# Patient Record
Sex: MALE | Race: WHITE | NOT HISPANIC OR LATINO | Employment: OTHER | URBAN - METROPOLITAN AREA
[De-identification: names, ages, dates, MRNs, and addresses within clinical notes are randomized per-mention and may not be internally consistent; named-entity substitution may affect disease eponyms.]

---

## 2017-01-04 ENCOUNTER — OFFICE VISIT (OUTPATIENT)
Dept: INTERNAL MEDICINE | Facility: CLINIC | Age: 77
End: 2017-01-04

## 2017-01-04 VITALS
HEART RATE: 71 BPM | HEIGHT: 72 IN | OXYGEN SATURATION: 98 % | DIASTOLIC BLOOD PRESSURE: 70 MMHG | BODY MASS INDEX: 28.19 KG/M2 | WEIGHT: 208.1 LBS | SYSTOLIC BLOOD PRESSURE: 112 MMHG

## 2017-01-04 DIAGNOSIS — E78.00 HYPERCHOLESTEROLEMIA: ICD-10-CM

## 2017-01-04 DIAGNOSIS — I25.10 CHRONIC CORONARY ARTERY DISEASE: ICD-10-CM

## 2017-01-04 DIAGNOSIS — K51.919 ULCERATIVE COLITIS WITH COMPLICATION, UNSPECIFIED LOCATION (HCC): ICD-10-CM

## 2017-01-04 DIAGNOSIS — I10 BENIGN ESSENTIAL HYPERTENSION: Primary | ICD-10-CM

## 2017-01-04 PROCEDURE — 99214 OFFICE O/P EST MOD 30 MIN: CPT | Performed by: INTERNAL MEDICINE

## 2017-01-04 NOTE — MR AVS SNAPSHOT
Driss Ackerman   1/4/2017 9:00 AM   Office Visit    Dept Phone:  [phone]   Encounter #:  [id number]    Provider:  Sanjuana De La Cruz MD   Department:  [facility] INTERNAL MEDICINE                Your Full Care Plan              Today's Medication Changes          These changes are accurate as of: 1/4/17  9:43 AM.  If you have any questions, ask your nurse or doctor.               Stop taking medication(s)listed here:     isosorbide mononitrate 30 MG 24 hr tablet   Commonly known as:  IMDUR   Stopped by:  Sanjuana De La Cruz MD           ramipril 2.5 MG capsule   Commonly known as:  ALTACE   Stopped by:  Sanjuana De La Cruz MD                      Your Updated Medication List          This list is accurate as of: 1/4/17  9:43 AM.  Always use your most recent med list.                acetaminophen 500 MG tablet   Commonly known as:  TYLENOL       aspirin 81 MG EC tablet       atorvastatin 10 MG tablet   Commonly known as:  LIPITOR   Take 1 tablet by mouth Daily.       Co Q 10 100 MG capsule       docusate sodium 100 MG capsule   Commonly known as:  COLACE       fish oil 1000 MG capsule capsule       mesalamine 1.2 G EC tablet   Commonly known as:  LIALDA       metoprolol succinate XL 25 MG 24 hr tablet   Commonly known as:  TOPROL-XL   Take 1 tablet by mouth Daily.       MULTIVITAMIN PO       Triamcinolone Acetonide 55 MCG/ACT nasal inhaler   Commonly known as:  NASACORT AQ   2 sprays into each nostril daily.       Vitamin B-12 ER 2000 MCG tablet controlled-release               You Were Diagnosed With        Codes Comments    Benign essential hypertension    -  Primary ICD-10-CM: I10  ICD-9-CM: 401.1     Chronic coronary artery disease     ICD-10-CM: I25.10  ICD-9-CM: 414.00     Ulcerative colitis with complication, unspecified location     ICD-10-CM: K51.919  ICD-9-CM: 556.9     Hypercholesterolemia     ICD-10-CM: E78.00  ICD-9-CM: 272.0       Instructions    Stop ramipril 2.5mg daily     Patient Instructions History      Upcoming Appointments     Visit Type Date Time Department    OFFICE VISIT 1/4/2017  9:00 AM [facility]    LAB 3/28/2017  3:10 PM  [facility]    FOLLOW UP 1 UNIT 3/28/2017  3:40 PM [facility]    [facility] 5/10/2017  9:10 AM [facility]    OFFICE VISIT 5/12/2017  9:45 AM [facility]      MyChart Signup     Our records indicate that you have an active [facility] Errplane account.    You can view your After Visit Summary by going to [employer] and logging in with your Errplane username and password.  If you don't have a Errplane username and password but a parent or guardian has access to your record, the parent or guardian should login with their own Errplane username and password and access your record to view the After Visit Summary.    If you have questions, you can email [email or web address] or call [phone] to talk to our Errplane staff.  Remember, Errplane is NOT to be used for urgent needs.  For medical emergencies, dial 911.               Other Info from Your Visit           Your Appointments     Mar 28, 2017  3:10 PM EDT   Lab with LAB CHAIR 1 [facility] (--)    [address] [address]   [phone]            Mar 28, 2017  3:40 PM EDT   FOLLOW UP with Alvin Jackman MD   [facility] ([facility])    [address] [address]   [phone]            May 10, 2017  9:10 AM EDT   [facility] with [facility] [place]   [facility] INTERNAL MEDICINE (--)    [address] [id number]   [phone]            May 12, 2017  9:45 AM EDT   Office Visit with Sanjuana De La Cruz MD   [facility] INTERNAL MEDICINE (--)    [address] [place] [id number] [phone]           Arrive 15  "minutes prior to appointment.              Allergies     Penicillins  Rash      Vital Signs     Blood Pressure Pulse Height Weight Oxygen Saturation Body Mass Index    112/70 (BP Location: Left arm, Patient Position: Sitting) 71 72.05\" (183 cm) 208 lb 1.6 oz (94.4 kg) 98% 28.18 kg/m2    Smoking Status                   Never Smoker           Problems and Diagnoses Noted     Benign essential hypertension    Heart disease due to blocked artery    High cholesterol    Hypogonadism in male    Inflammatory bowel disease (ulcerative colitis)      No Longer an Issue     Elevated white blood cell count        "

## 2017-01-04 NOTE — PROGRESS NOTES
Subjective   Driss Ackerman is a 76 y.o. male here today to f/u on HTN, memory loss and speech.  Pt has an MRI of the brain and visit with Dr Arcenio De La Cruz  Pt was taken off the imdur at last visit.  Pt states that he has been getting light headed x 2 days late afternoon.      History of Present Illness   Pt has been taking cholesterol meds as prescribed.  No difficulties with myalgias.   He has noticed his voice is better since he has seen ENT and he takes deep breathe  Pt has been taking BP meds as prescribed without any problems.  No RICE He does occas get light headed and feel like he may fall when he stands up quickly  He has been off the imdur for several weeks and reports he has not had any CP    The following portions of the patient's history were reviewed and updated as appropriate: allergies, current medications, past medical history, past social history and problem list.  He has been on a low ft diet with exercise and stress reduction    Review of Systems   Neurological: Positive for light-headedness.   All other systems reviewed and are negative.      Objective   Physical Exam   Constitutional: He is oriented to person, place, and time. He appears well-developed and well-nourished.   HENT:   Head: Normocephalic and atraumatic.   Right Ear: External ear normal.   Left Ear: External ear normal.   Mouth/Throat: Oropharynx is clear and moist.   Eyes: Conjunctivae and EOM are normal. Pupils are equal, round, and reactive to light.   Neck: Normal range of motion. No tracheal deviation present. No thyromegaly present.   Cardiovascular: Normal rate, regular rhythm, normal heart sounds and intact distal pulses.    Pulmonary/Chest: Effort normal and breath sounds normal.   Musculoskeletal: Normal range of motion. He exhibits no edema or deformity.   Neurological: He is alert and oriented to person, place, and time.   Skin: Skin is warm and dry.   Psychiatric: He has a normal mood and affect. His behavior is normal.  Judgment and thought content normal.   Vitals reviewed.      Vitals:    01/04/17 0910   BP: 112/70   Pulse: 71   SpO2: 98%        Current Outpatient Prescriptions:   •  acetaminophen (TYLENOL) 500 MG tablet, Take 500 mg by mouth Daily., Disp: , Rfl:   •  aspirin 81 MG EC tablet, Take 81 mg by mouth Daily., Disp: , Rfl:   •  atorvastatin (LIPITOR) 10 MG tablet, Take 1 tablet by mouth Daily., Disp: 90 tablet, Rfl: 0  •  Coenzyme Q10 (CO Q 10) 100 MG capsule, Take 3 tablets by mouth daily., Disp: , Rfl:   •  Cyanocobalamin (VITAMIN B-12 ER) 2000 MCG tablet controlled-release, Place 2 tablets under the tongue daily., Disp: , Rfl:   •  docusate sodium (COLACE) 100 MG capsule, Take 100 mg by mouth daily., Disp: , Rfl:   •  mesalamine (LIALDA) 1.2 G EC tablet, Take 4 tablets by mouth daily., Disp: , Rfl:   •  metoprolol succinate XL (TOPROL-XL) 25 MG 24 hr tablet, Take 1 tablet by mouth Daily. (Patient taking differently: Take 0.5 mg by mouth Daily.), Disp: 90 tablet, Rfl: 1  •  Multiple Vitamins-Minerals (MULTIVITAMIN PO), Take 1 tablet by mouth daily., Disp: , Rfl:   •  Omega-3 Fatty Acids (FISH OIL) 1000 MG capsule capsule, Take  by mouth daily with breakfast., Disp: , Rfl:   •  Triamcinolone Acetonide (NASACORT AQ) 55 MCG/ACT nasal inhaler, 2 sprays into each nostril daily., Disp: 1 each, Rfl: 11    12/23 labs- ldl-63 HDL 34    Assessment/Plan   Diagnoses and all orders for this visit:    Benign essential hypertension  -     Comprehensive Metabolic Panel; Future  -     LP+LDL / HDL Ratio ([facility]); Future  -     TSH Rfx On Abnormal To Free T4; Future    Chronic coronary artery disease  -     Comprehensive Metabolic Panel; Future  -     LP+LDL / HDL Ratio ([facility]); Future  -     TSH Rfx On Abnormal To Free T4; Future    Ulcerative colitis with complication, unspecified location    Hypercholesterolemia  -     TSH Rfx On Abnormal To Free T4; Future      1. HTN-  He is having some int orthostasis and he is a fall risk.   We will stop the altace.  Cont the toprol and he has a hx of atrial fib  2. HPL- stable with atorvastatin  3. Colitis-  He is stable with mesalamine and he has been stable and is seeing Dr. Gonzalez  4. CAD-  S/p cabg 10years ago  No sx.  Doing really well with a heart healthy program

## 2017-01-09 ENCOUNTER — TELEPHONE (OUTPATIENT)
Dept: INTERNAL MEDICINE | Facility: CLINIC | Age: 77
End: 2017-01-09

## 2017-01-09 RX ORDER — ATORVASTATIN CALCIUM 10 MG/1
10 TABLET, FILM COATED ORAL DAILY
Qty: 90 TABLET | Refills: 1 | Status: SHIPPED | OUTPATIENT
Start: 2017-01-09 | End: 2017-04-13

## 2017-01-09 NOTE — TELEPHONE ENCOUNTER
RX SENT TO PHARMACY    ----- Message from Gena Torres sent at 1/9/2017 12:06 PM EST -----  Pt needs 90 day RX sent to [facility] for:  Atorvastatin 10 mg.

## 2017-03-27 DIAGNOSIS — D64.9 ANEMIA, UNSPECIFIED TYPE: Primary | ICD-10-CM

## 2017-03-28 ENCOUNTER — OFFICE VISIT (OUTPATIENT)
Dept: ONCOLOGY | Facility: CLINIC | Age: 77
End: 2017-03-28

## 2017-03-28 ENCOUNTER — LAB (OUTPATIENT)
Dept: OTHER | Facility: HOSPITAL | Age: 77
End: 2017-03-28

## 2017-03-28 VITALS
RESPIRATION RATE: 18 BRPM | SYSTOLIC BLOOD PRESSURE: 150 MMHG | TEMPERATURE: 98.3 F | HEIGHT: 72 IN | DIASTOLIC BLOOD PRESSURE: 85 MMHG | OXYGEN SATURATION: 95 % | HEART RATE: 67 BPM | WEIGHT: 208.4 LBS | BODY MASS INDEX: 28.23 KG/M2

## 2017-03-28 DIAGNOSIS — C91.10 CHRONIC LYMPHOCYTIC LEUKEMIA (HCC): Primary | ICD-10-CM

## 2017-03-28 DIAGNOSIS — D64.9 ANEMIA, UNSPECIFIED TYPE: ICD-10-CM

## 2017-03-28 LAB
DEPRECATED RDW RBC AUTO: 45.9 FL (ref 37–54)
EOSINOPHIL # BLD MANUAL: 0.16 10*3/MM3 (ref 0–0.7)
EOSINOPHIL NFR BLD MANUAL: 1 % (ref 0–7)
ERYTHROCYTE [DISTWIDTH] IN BLOOD BY AUTOMATED COUNT: 13.2 % (ref 11.5–14.5)
FERRITIN SERPL-MCNC: 88.7 NG/ML (ref 30–400)
HCT VFR BLD AUTO: 38.4 % (ref 40.4–52.2)
HGB BLD-MCNC: 13.1 G/DL (ref 13.7–17.6)
IRON 24H UR-MRATE: 114 MCG/DL (ref 59–158)
IRON SATN MFR SERPL: 34 % (ref 20–50)
LYMPHOCYTES # BLD MANUAL: 11.11 10*3/MM3 (ref 0.8–7)
LYMPHOCYTES NFR BLD MANUAL: 1 % (ref 0–12)
LYMPHOCYTES NFR BLD MANUAL: 69 % (ref 24–44)
MCH RBC QN AUTO: 32.5 PG (ref 27–32.7)
MCHC RBC AUTO-ENTMCNC: 34.1 G/DL (ref 32.6–36.4)
MCV RBC AUTO: 95.3 FL (ref 79.8–96.2)
MONOCYTES # BLD AUTO: 0.16 10*3/MM3 (ref 0–1)
NEUTROPHILS # BLD AUTO: 3.7 10*3/MM3 (ref 1.9–8.1)
NEUTROPHILS NFR BLD MANUAL: 23 % (ref 42.7–76)
PLAT MORPH BLD: NORMAL
PLATELET # BLD AUTO: 158 10*3/MM3 (ref 140–500)
PMV BLD AUTO: 9.5 FL (ref 6–12)
RBC # BLD AUTO: 4.03 10*6/MM3 (ref 4.6–6)
RBC MORPH BLD: NORMAL
SCAN SLIDE: NORMAL
SMUDGE CELLS BLD QL SMEAR: ABNORMAL
TIBC SERPL-MCNC: 335 MCG/DL (ref 298–536)
TRANSFERRIN SERPL-MCNC: 225 MG/DL (ref 200–360)
VARIANT LYMPHS NFR BLD MANUAL: 6 % (ref 0–5)
VIT B12 BLD-MCNC: 1367 PG/ML (ref 211–946)
WBC NRBC COR # BLD: 16.1 10*3/MM3 (ref 4.5–10.7)

## 2017-03-28 PROCEDURE — 82607 VITAMIN B-12: CPT | Performed by: INTERNAL MEDICINE

## 2017-03-28 PROCEDURE — 99214 OFFICE O/P EST MOD 30 MIN: CPT | Performed by: INTERNAL MEDICINE

## 2017-03-28 PROCEDURE — 83540 ASSAY OF IRON: CPT | Performed by: INTERNAL MEDICINE

## 2017-03-28 PROCEDURE — 36415 COLL VENOUS BLD VENIPUNCTURE: CPT

## 2017-03-28 PROCEDURE — 82728 ASSAY OF FERRITIN: CPT | Performed by: INTERNAL MEDICINE

## 2017-03-28 PROCEDURE — 85007 BL SMEAR W/DIFF WBC COUNT: CPT | Performed by: INTERNAL MEDICINE

## 2017-03-28 PROCEDURE — 85025 COMPLETE CBC W/AUTO DIFF WBC: CPT | Performed by: INTERNAL MEDICINE

## 2017-03-28 PROCEDURE — 84466 ASSAY OF TRANSFERRIN: CPT | Performed by: INTERNAL MEDICINE

## 2017-03-28 RX ORDER — RAMIPRIL 2.5 MG/1
2.5 CAPSULE ORAL DAILY
COMMUNITY
End: 2018-06-13

## 2017-03-28 NOTE — PROGRESS NOTES
REASON FOR FOLLOWUP: CHRONIC LYMPHOCYTIC LEUKEMIA. PATEL STAGE II.      HISTORY OF PRESENT ILLNESS:  Driss Ackerman is a 76 y.o. male who comes in today for 6-month followup. The patient is in the company of his wife stating that actually he continues doing well with a terrific appetite after he just returned from vacation, gaining a few pounds of weight. He has not had any fevers, chills, night sweats, pruritus, peripheral adenopathy, rashes in the skin. He has minimal if any back pain and occasional pain in the right ankle for which he underwent surgery more than a year ago. He has noticed some sluggishness in his mental activity and also in his ability to walk but he has not had any obvious motor or sensory deficit. He has not had any difficulty with cranial nerves, neither alterations with tremor or rigidity or spasticity. He has not noticed any alterations in his language neither. His vision remains fine and his hearing remains limited with hearing aids ongoing and functioning well. He has normal bowel activity. No passage of blood in the stools. Normal urination with some frequency. No cardiovascular or respiratory issues at this time. Again, no infections and no obvious autoimmunity.          Medical History              Past Medical History   Diagnosis Date   • Actinic keratosis     • Allergic rhinitis     • Anemia     • Arteriosclerosis of carotid artery     • Arthritis     • Atrial fibrillation     • CAD (coronary artery disease)     • Constipation     • Eczema     • Fatigue     • Hearing loss     • HTN (hypertension)     • Hypercholesteremia     • Hypogonadism male     • Leukocytosis     • Memory change     • Myalgia     • Osteoarthritis         ANKLE, FOOT, RIGHT   • Prostatitis     • Ulcerative colitis               Surgical History           Past Surgical History   Procedure Laterality Date   • Coronary artery bypass graft       • Total knee arthroplasty           left 4/2015, right 11/2015         Right ankle arthrodesis with large bone graft and realignment. Right peroneus brevis tendon repair on 4/25/16 by .      HEMATOLOGY/ONCOLOGY HISTORY:   Patient was initially seen on 3/24/2016 for evaluation leukocytosis. The patient previously was seen by Dr. Landon Phillips and since Dr. Phillips left town recently, he has been seeing Dr. De La Cruz. This patient was seen by orthopedic surgeon, Dr. Gonzalez, for planned right ankle surgical procedure. However, because of elevated WBC patient is referred to us for evaluation and his surgery has been on hold.      Patient reports no fever, sweating, chills and especially no night sweats. He has no significant weight losses (he did have some weight loss after the surgery but has regained). The patient otherwise has no B symptoms or other specific complaints.      According to the patient, he was told having elevated WBC counts in October of 2015 when he had right knee replacement. Per computer records, the last time he had normal WBC was back in July of 2013 when he had laboratory study at Dr. Huntley’s office on 07/16/2013. The study reported a total WBC of 5200 including neutrophils 2600 and lymphocytes 2300. His hemoglobin was 14.7 and platelets 173,000. MCV was 102. His chemistry lab reported normal creatinine of 0.71, normal electrolytes and normal liver function panel. His TSH was also normal at 1.28 and vitamin D at 34.8 ng/mL. There were no CBC results available for review until 10/06/2015 when he had total WBC of 12,800 including neutrophils 2900 and lymphocytes 9600. His hemoglobin was 12.4, MCV 91, platelets 171,000. At that time patient had right knee replacement. Chemistry lab showed normal TSH of 1.8 and vitamin D at 29.6 ng/mL.     On April 16 of 2015, laboratory study on the day of surgery reported a total WBC of 13,300 including neutrophils 2400, lymphocytes 10,400, hemoglobin 13.5 and platelets 149,000. MCV was 95. Postop hemoglobin was 10.5 on April  2015.      On 3/24/16, the day of his initial oncology evaluation, his total WBC was 10,960, ANC 3000 and lymphocyte 7,210, Platelet 147,000 and hemoglobin 12.3 g/dl. , normal CMP. Serum iron 150, TIBC 321, iron saturation 47, ferritin 60.5 ng/ml, folate 16.6 ng/ml, B12 at 625 pg/ml.      Peripheral blood Flowcytometry study on 3/24/16 reported chronic lymphocytic leukemia. Negative for CD38 and ZAP70 by flow. The CLL panel reported positive for 55.5% cells deletion of chrom 13q14 DLEU1/DLEU2, negative for all the others.      CT scan on 3/29/2016 reported the neck and chest are largely unremarkable. No mass or adenopathy is present at these levels. The spleen is enlarged 16 cm at the craniocaudal extent. There are some mildly prominent lymph nodes in the bilateral inguinal regions with the largest node at the right inguinal level measuring up to 2.7 cm long axis.  . No further adenopathy is identified within the abdomen or pelvis.      SOCIAL HISTORY:  reports that he has never smoked. He does not have any smokeless tobacco history on file. He reports that he does not drink alcohol. His drug history is not on file. Patient is a retired from [employer], worked with the administration office.     FAMILY HISTORY:  family history includes Heart disease in his father and mother. Mother was diagnosed of a chronic leukemia, at about age of 55, she passed away at age of 90 to have received the chemotherapy for her malignancy. Father  at age of 85 and because of heart disease. Patient has no siblings. Patient has no inflammation about the grandparents on both sides.      ALLERGIES:          Allergies   Allergen Reactions   • Penicillins Rash       MEDICATIONS: reviewed and documented on chart, see EMR.        Review of Systems  Constitutional: See history of present illness.   HENT: Negative for congestion, ear discharge, facial swelling, mouth sores, sneezing and sore throat. patient has poor  "hearing.  Eyes: Negative for photophobia, redness and visual disturbance.   Respiratory: Negative for apnea, cough, chest tightness and shortness of breath.   Cardiovascular: Negative for palpitations, chest pain.   Gastrointestinal: Negative for abdominal distention, nausea and vomiting. patient has ulcerative colitis, controlled with medicine. No melena hematochezia  Endocrine: Negative for cold intolerance and polydipsia.   Genitourinary: Negative for difficulty urinating and dysuria.   Musculoskeletal: Has chronic her or see arthritis required a multiple surgery.   Skin: Negative for rash. no bruises no petechiae. Skin warm to touch.  Allergic/Immunologic: Negative for food allergies and immunocompromised state.   Neurological: Negative for dizziness,and headaches. patient reports shaking of his hands and with small hand writings in the past several months.   Hematological: Negative for adenopathy. see history of present illness.   Psychiatric/Behavioral: Negative for sleep disturbance. The patient is not nervous/anxious.      Vitals:    03/28/17 1430   BP: 150/85   Pulse: 67   Resp: 18   Temp: 98.3 °F (36.8 °C)   TempSrc: Oral   SpO2: 95%   Weight: 208 lb 6.4 oz (94.5 kg)   Height: 72.05\" (183 cm)   PainSc: 0-No pain     Physical Exam   GENERAL:  Well-developed, well-nourished  Patient  in no acute distress.   SKIN:  Warm, dry without rashes, purpura or petechiae.  HEENT:  Pupils were equal and reactive to light and accomodation, conjunctivas non injected, no pterigion, normal extraocular movements, normal visual acuity.   Mouth mucosa was moist, no exudates in oropharynx, normal gum line, normal roof of the mouth and pillars, normal papillations of the tongue.Ear canals were normal, as well tympanic membranes, normal hearing acuity.No pain in mastoid area or erythema.  NECK:  Supple with good range of motion; no thyromegaly or masses, no JVD or bruits, no cervical adenopathies.No carotid arteries pain, no " carotid abnormal pulsation or arterial dance.  LYMPHATICS:  No cervical, supraclavicular, axillary, epitrochlear or inguinal adenopathy.  CHEST:  Normal excursion of both starla thoraces, normal voice fremitus, no subcutaneous emphysema, normal axillas, no rashes or acanthosis nigricans. Lungs clear to percussion and auscultation, normal breath sounds bilaterally, no wheezing, crackles or ronchi, no stridor, no rubs.  CARDIAC AND VASCULAR: PMI not displaced,no thrills, normal rate and regular rhythm, without murmurs, rubs or S3 or S4 right or left sided gallops. Normal femoral, popliteal, pedis, brachial and carotid pulses.  ABDOMEN:  Soft, nontender with no organomegaly or masses, no ascites, no collateral circulation,no distention,no Juliano sign, no abdominal pain, no inguinal hernias,no umbilical hernias, no abdominal bruits. Normal bowel sounds.  GENITAL: Not  Performed.  EXTREMITIES  AND SPINE:  No clubbing, cyanosis or edema, no deformities or pain .No kyphosis, scoliosis, deformities or pain in spine, ribs or pelvic bone.  NEUROLOGICAL:  Patient was awake, alert, oriented to time, person and place,normal gait and coordination, negative Romberg; cranial nerves were normal, motor strength in upper and lower extremities was 5/5 proximally and distally, reflexes were symmetric, toes were down going, normal sensory modalities to touch, pinprick, temperature discrimination, and vibratory sensation, normal propioception, no meningeal signs with supple neck.NO TREMOR, RIGIDITY OR SPASTICITY.           DIAGNOSTIC      Lab Results   Component Value Date    WBC 16.10 (H) 03/28/2017    HGB 13.1 (L) 03/28/2017    HCT 38.4 (L) 03/28/2017    MCV 95.3 03/28/2017     03/28/2017     Lab Results   Component Value Date    NEUTROABS 3.23 09/14/2016         lymph  11,250     Lab Results   Component Value Date    GLUCOSE 125 (H) 09/14/2016    BUN 13 09/14/2016    CREATININE 0.80 12/01/2016    EGFRIFNONA 90 09/14/2016     EGFRIFAFRI  09/14/2016      Comment:      <15 Indicative of kidney failure.    BCR 15.7 09/14/2016    CO2 28.5 09/14/2016    CALCIUM 9.6 09/14/2016    PROTENTOTREF 6.5 03/14/2016    ALBUMIN 4.40 09/14/2016    LABIL2 1.8 09/14/2016    AST 14 09/14/2016    ALT 13 09/14/2016     Sodium   Date Value Ref Range Status   09/14/2016 143 134 - 145 mmol/L Final     Potassium   Date Value Ref Range Status   09/14/2016 4.0 3.5 - 4.7 mmol/L Final     Total Bilirubin   Date Value Ref Range Status   09/14/2016 0.5 0.1 - 1.2 mg/dL Final     Alkaline Phosphatase   Date Value Ref Range Status   09/14/2016 81 38 - 116 U/L Final   ]  Lab Results   Component Value Date    IRON 127 09/14/2016    TIBC 309 09/14/2016    FERRITIN 75.60 09/14/2016       ASSESSMENT:  1. Chronic lymphocytic leukemia, Koch stage II (splenomegaly, without severe anemia or thrombocytopenia). 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 No B symptom. The patient has no obvious alteration in the laboratory parameters comparing on how the blood counts were in 04/2015, having persistent leukocytosis, lymphocytosis, normal hemoglobin, normal platelet count. He has no progressive adenopathy. No progressive splenomegaly. No infections and no autoimmunity.     From the point of view of the sluggishness of his neurological function, I find not too much of anything in the physical examination besides a sluggish gait. He has no tremor. No spasticity. No rigidity. He has no abnormal movements. No obvious neuropathy. Normal balance. Normal coordination and normal strength in upper and lower extremities with no obvious alteration in cranial nerves. I doubt that he has had any strokes or anything of this nature but he is going to see his primary physician, Dr. De La Cruz, for further review. Nevertheless, I encouraged him to remain physically active, ride a static bicycle, go swimming and do a lot of  walking instead of sitting in the computer all day long.     From his leukemia point of view, I told him the issues that he could develop in case that this becomes symptomatic including fever, chills, night sweats, pruritus, lack of appetite, weight loss, rashes in the skin and progressive adenopathy. If that is the case he is supposed to give us a contact call. Otherwise, I will advise him to return to see us in 6 months with a CBC for review. I have not reviewed or changed any medicines, neither prescribed any medicines today.

## 2017-04-13 RX ORDER — ATORVASTATIN CALCIUM 10 MG/1
TABLET, FILM COATED ORAL
Qty: 90 TABLET | Refills: 1 | Status: SHIPPED | OUTPATIENT
Start: 2017-04-13 | End: 2017-11-15 | Stop reason: SDUPTHER

## 2017-05-04 ENCOUNTER — RESULTS ENCOUNTER (OUTPATIENT)
Dept: INTERNAL MEDICINE | Facility: CLINIC | Age: 77
End: 2017-05-04

## 2017-05-04 DIAGNOSIS — I10 BENIGN ESSENTIAL HYPERTENSION: ICD-10-CM

## 2017-05-04 DIAGNOSIS — I25.10 CHRONIC CORONARY ARTERY DISEASE: ICD-10-CM

## 2017-05-04 DIAGNOSIS — E78.00 HYPERCHOLESTEROLEMIA: ICD-10-CM

## 2017-05-10 LAB
ALBUMIN SERPL-MCNC: 4.5 G/DL (ref 3.5–5.2)
ALBUMIN/GLOB SERPL: 1.9 G/DL
ALP SERPL-CCNC: 79 U/L (ref 39–117)
ALT SERPL-CCNC: 24 U/L (ref 1–41)
AST SERPL-CCNC: 21 U/L (ref 1–40)
BILIRUB SERPL-MCNC: 0.5 MG/DL (ref 0.1–1.2)
BUN SERPL-MCNC: 14 MG/DL (ref 8–23)
BUN/CREAT SERPL: 18.4 (ref 7–25)
CALCIUM SERPL-MCNC: 10 MG/DL (ref 8.6–10.5)
CHLORIDE SERPL-SCNC: 103 MMOL/L (ref 98–107)
CHOLEST SERPL-MCNC: 138 MG/DL (ref 0–200)
CO2 SERPL-SCNC: 25 MMOL/L (ref 22–29)
CREAT SERPL-MCNC: 0.76 MG/DL (ref 0.76–1.27)
GLOBULIN SER CALC-MCNC: 2.4 GM/DL
GLUCOSE SERPL-MCNC: 128 MG/DL (ref 65–99)
HDLC SERPL-MCNC: 44 MG/DL (ref 40–60)
LDLC SERPL CALC-MCNC: 70 MG/DL (ref 0–100)
LDLC/HDLC SERPL: 1.58 {RATIO}
POTASSIUM SERPL-SCNC: 4.3 MMOL/L (ref 3.5–5.2)
PROT SERPL-MCNC: 6.9 G/DL (ref 6–8.5)
SODIUM SERPL-SCNC: 143 MMOL/L (ref 136–145)
TRIGL SERPL-MCNC: 122 MG/DL (ref 0–150)
TSH SERPL DL<=0.005 MIU/L-ACNC: 1.81 MIU/ML (ref 0.27–4.2)
VLDLC SERPL CALC-MCNC: 24.4 MG/DL (ref 5–40)

## 2017-05-12 ENCOUNTER — OFFICE VISIT (OUTPATIENT)
Dept: INTERNAL MEDICINE | Facility: CLINIC | Age: 77
End: 2017-05-12

## 2017-05-12 VITALS
SYSTOLIC BLOOD PRESSURE: 110 MMHG | WEIGHT: 208.6 LBS | HEART RATE: 65 BPM | BODY MASS INDEX: 28.25 KG/M2 | HEIGHT: 72 IN | OXYGEN SATURATION: 95 % | DIASTOLIC BLOOD PRESSURE: 68 MMHG

## 2017-05-12 DIAGNOSIS — E78.00 HYPERCHOLESTEROLEMIA: ICD-10-CM

## 2017-05-12 DIAGNOSIS — I10 BENIGN ESSENTIAL HYPERTENSION: Primary | ICD-10-CM

## 2017-05-12 DIAGNOSIS — R27.0 ATAXIA: ICD-10-CM

## 2017-05-12 DIAGNOSIS — R53.1 WEAKNESS: ICD-10-CM

## 2017-05-12 PROCEDURE — 99214 OFFICE O/P EST MOD 30 MIN: CPT | Performed by: INTERNAL MEDICINE

## 2017-05-18 ENCOUNTER — TREATMENT (OUTPATIENT)
Dept: PHYSICAL THERAPY | Facility: CLINIC | Age: 77
End: 2017-05-18

## 2017-05-18 DIAGNOSIS — R26.9 ABNORMAL GAIT: Primary | ICD-10-CM

## 2017-05-18 DIAGNOSIS — R53.1 WEAKNESS: ICD-10-CM

## 2017-05-18 PROCEDURE — 97161 PT EVAL LOW COMPLEX 20 MIN: CPT | Performed by: PHYSICAL THERAPIST

## 2017-05-18 PROCEDURE — 97110 THERAPEUTIC EXERCISES: CPT | Performed by: PHYSICAL THERAPIST

## 2017-05-18 PROCEDURE — 97116 GAIT TRAINING THERAPY: CPT | Performed by: PHYSICAL THERAPIST

## 2017-05-23 ENCOUNTER — TREATMENT (OUTPATIENT)
Dept: PHYSICAL THERAPY | Facility: CLINIC | Age: 77
End: 2017-05-23

## 2017-05-23 DIAGNOSIS — R53.1 WEAKNESS: ICD-10-CM

## 2017-05-23 DIAGNOSIS — R26.9 ABNORMAL GAIT: Primary | ICD-10-CM

## 2017-05-23 PROCEDURE — 97116 GAIT TRAINING THERAPY: CPT | Performed by: PHYSICAL THERAPIST

## 2017-05-23 PROCEDURE — 97110 THERAPEUTIC EXERCISES: CPT | Performed by: PHYSICAL THERAPIST

## 2017-05-24 ENCOUNTER — OFFICE VISIT (OUTPATIENT)
Dept: NEUROLOGY | Facility: CLINIC | Age: 77
End: 2017-05-24

## 2017-05-24 VITALS
OXYGEN SATURATION: 95 % | SYSTOLIC BLOOD PRESSURE: 120 MMHG | BODY MASS INDEX: 28.31 KG/M2 | HEIGHT: 72 IN | WEIGHT: 209 LBS | HEART RATE: 67 BPM | DIASTOLIC BLOOD PRESSURE: 82 MMHG

## 2017-05-24 DIAGNOSIS — R26.81 GAIT INSTABILITY: ICD-10-CM

## 2017-05-24 DIAGNOSIS — R25.1 TREMOR: Primary | ICD-10-CM

## 2017-05-24 DIAGNOSIS — R41.3 MEMORY IMPAIRMENT: ICD-10-CM

## 2017-05-24 DIAGNOSIS — K11.7 SIALORRHEA: ICD-10-CM

## 2017-05-24 PROCEDURE — 99214 OFFICE O/P EST MOD 30 MIN: CPT | Performed by: NURSE PRACTITIONER

## 2017-05-24 RX ORDER — PEG-3350, SODIUM SULFATE, SODIUM CHLORIDE, POTASSIUM CHLORIDE, SODIUM ASCORBATE AND ASCORBIC ACID 7.5-2.691G
2000 KIT ORAL
COMMUNITY
Start: 2016-07-21 | End: 2017-11-07

## 2017-05-25 ENCOUNTER — TREATMENT (OUTPATIENT)
Dept: PHYSICAL THERAPY | Facility: CLINIC | Age: 77
End: 2017-05-25

## 2017-05-25 DIAGNOSIS — R53.1 WEAKNESS: ICD-10-CM

## 2017-05-25 DIAGNOSIS — R26.9 ABNORMAL GAIT: Primary | ICD-10-CM

## 2017-05-25 PROCEDURE — 97116 GAIT TRAINING THERAPY: CPT | Performed by: PHYSICAL THERAPIST

## 2017-05-25 PROCEDURE — 97112 NEUROMUSCULAR REEDUCATION: CPT | Performed by: PHYSICAL THERAPIST

## 2017-06-01 ENCOUNTER — TREATMENT (OUTPATIENT)
Dept: PHYSICAL THERAPY | Facility: CLINIC | Age: 77
End: 2017-06-01

## 2017-06-01 DIAGNOSIS — R26.9 ABNORMAL GAIT: Primary | ICD-10-CM

## 2017-06-01 DIAGNOSIS — R53.1 WEAKNESS: ICD-10-CM

## 2017-06-01 PROCEDURE — 97140 MANUAL THERAPY 1/> REGIONS: CPT | Performed by: PHYSICAL THERAPIST

## 2017-06-01 PROCEDURE — 97110 THERAPEUTIC EXERCISES: CPT | Performed by: PHYSICAL THERAPIST

## 2017-06-01 NOTE — PROGRESS NOTES
Physical Therapy Daily Progress Note  Patient has been seen for 4 sessions    Subjective     Driss Ackerman reports: that he did have a cataract removed earlier this week, he reports that it is healing but his MD told him to take it easy.     Pain Scale:  0/10    Objective   See Exercise, Manual, and Modality Logs for complete treatment.       Assessment/Plan  Continued to work on balance and lower extremity strengthening to increase his stability.  He does have difficulty with starting and stopping at this time.     Progress per Plan of Care           Therapy Exercise 19519 15 minutes and NMR 72168 15 minutes    Timed Treatment:   30   mins   Total Treatment:    45   mins    Ximena Lane, PT  Physical Therapist  [place] License # [id number]

## 2017-06-06 ENCOUNTER — TREATMENT (OUTPATIENT)
Dept: PHYSICAL THERAPY | Facility: CLINIC | Age: 77
End: 2017-06-06

## 2017-06-06 DIAGNOSIS — R53.1 WEAKNESS: ICD-10-CM

## 2017-06-06 DIAGNOSIS — R26.9 ABNORMAL GAIT: Primary | ICD-10-CM

## 2017-06-06 PROCEDURE — 97110 THERAPEUTIC EXERCISES: CPT | Performed by: PHYSICAL THERAPIST

## 2017-06-06 PROCEDURE — 97116 GAIT TRAINING THERAPY: CPT | Performed by: PHYSICAL THERAPIST

## 2017-06-06 NOTE — PROGRESS NOTES
Physical Therapy Daily Progress Note  Patient has been seen for 5 sessions    Subjective     Driss Ackerman reports: that he has been having a good week but continues to have some difficulty with start and stopping.     Pain Scale:  0/10    Objective   See Exercise, Manual, and Modality Logs for complete treatment.       Assessment/Plan  Mr. Ackerman demonstrated some difficulty with starting stopping when walking so we focused on this, he did well when walking in a straight line but he had difficulty when side stepping.     Progress per Plan of Care           Therapy Exercise 92293 15 minutes and Gait Training 03081 15 minutes    Timed Treatment:   30   mins   Total Treatment:     60   mins    Ximena Lane, PT  Physical Therapist  [place] License # [id number]

## 2017-06-13 ENCOUNTER — TELEPHONE (OUTPATIENT)
Dept: NEUROLOGY | Facility: CLINIC | Age: 77
End: 2017-06-13

## 2017-06-13 ENCOUNTER — TREATMENT (OUTPATIENT)
Dept: PHYSICAL THERAPY | Facility: CLINIC | Age: 77
End: 2017-06-13

## 2017-06-13 DIAGNOSIS — R26.9 ABNORMAL GAIT: Primary | ICD-10-CM

## 2017-06-13 DIAGNOSIS — R53.1 WEAKNESS: ICD-10-CM

## 2017-06-13 PROCEDURE — 97116 GAIT TRAINING THERAPY: CPT | Performed by: PHYSICAL THERAPIST

## 2017-06-13 PROCEDURE — 97110 THERAPEUTIC EXERCISES: CPT | Performed by: PHYSICAL THERAPIST

## 2017-06-13 NOTE — PROGRESS NOTES
Physical Therapy Daily Progress Note  Patient has been seen for 6 sessions    Subjective     Driss Ackerman reports: that he was sorry he missed his last appointment but he was stuck at an MD appointment.  He reports that he has been doing his exercises at home.     Pain Scale: 0 /10    Objective   See Exercise, Manual, and Modality Logs for complete treatment.       Assessment/Plan  Mr. Ackerman was able to advance a lot of his exercises today with adding more weight. Today however during the hurdles he lost his balance and had a lot of difficulty once in a wider stance to bring his other leg closer.  I explained to him that this is a symptoms of Parkinsons and that the starting and stopping is hard to progress out of.     Progress per Plan of Care           Therapy Exercise 28547 15 minutes and Gait Training 86303 10 minutes  Other patient's were under my care during his treatment.    Timed Treatment:   25   mins   Total Treatment:     60   mins    Ximena Lane PT  Physical Therapist  [place] License # [id number]

## 2017-06-14 NOTE — TELEPHONE ENCOUNTER
NUCLEAR MEDICINE REPORT        FACILITY:  [facility]    UNIT/AGE/GENDER: ALFREDO  OP      AGE:77 Y          SEX:M    PATIENT NAME/:  LEONARD PAGAN P    1940    UNIT NUMBER:  [id number]    ACCOUNT NUMBER:  [id number]    ACCESSION NUMBER:  [id number]            Nuclear medicine I-123 Bala scan of the Brain        Examination date: 2017 at 1:15 PM        HISTORY: Tremor         COMPARISON: None        FINDINGS: Patient was first given 0.13 mL of SSKI one hour prior to injection of 5.5 mCi of I-123 Bala scan. Images of the brain were then obtained approximately 4 hours after injection.        Images of the brain reveal abnormal decreased uptake of the radiopharmaceutical within the striatal neurons of the basal ganglia. Significant decreased uptake is noted within the posterior portions of the basal ganglia. The pattern is relatively     symmetric.        IMPRESSION:    1. Abnormal decreased uptake within the basal ganglia. This can be seen in patients with Parkinson's disease, multisystem atrophy, progressive supranuclear palsy, or diffuse Lewy body disease.        Dictated by: Antonio George M.D.        Images and Report reviewed and interpreted by: Antonio George M.D.        <PS><Electronically signed by: Antonio George M.D.>    2017 1517        D: 2017 1516    T: 2017 1516

## 2017-06-15 ENCOUNTER — TREATMENT (OUTPATIENT)
Dept: PHYSICAL THERAPY | Facility: CLINIC | Age: 77
End: 2017-06-15

## 2017-06-15 DIAGNOSIS — R53.1 WEAKNESS: ICD-10-CM

## 2017-06-15 DIAGNOSIS — R26.9 ABNORMAL GAIT: Primary | ICD-10-CM

## 2017-06-15 PROCEDURE — 97110 THERAPEUTIC EXERCISES: CPT | Performed by: PHYSICAL THERAPIST

## 2017-06-15 NOTE — PROGRESS NOTES
Physical Therapy Daily Progress Note  Patient has been seen for Visit count could not be calculated. Make sure you are using a visit which is associated with an episode. sessions    Subjective     Driss Ackerman reports: that he is feeling very fatigued today compared to the prior visit.  He reports that he feels like his strength as improved but he continues to have a hard time with start stopping.     Pain Scale: 0 /10    Objective   See Exercise, Manual, and Modality Logs for complete treatment.       Assessment/Plan  Mr. Ackerman continues to have weakness in his legs and poor balance.  He has made improvements with his strength however he continues to have difficulty with start stopping and has begun more of a shuffling gait.  I have spoken with him before about the [facility] program and he seems very interested in it.  We have discussed making today his last day and once he returns from his trip to begin the Big and loud program at [facility] which he was in agreement with.    Other Discharge and progress to [facility] and Loud Program           Therapy Exercise 57516 30 minutes    Timed Treatment:   30   mins   Total Treatment:     45   mins    Ximena Lane PT  Physical Therapist  [place] License # [id number]

## 2017-06-15 NOTE — PROGRESS NOTES
Discharge Report    Patient Name: Driss Ackerman       Patient MRN: [id number]  : 1940  Physician:Sanjuana De La Cruz MD  Date: 6/15/2017    Encounter Diagnoses   Name Primary?   • Abnormal gait Yes   • Weakness        Treatment has included therapeutic exercise, neuromuscular re-education and gait training    Physical Therapy Daily Progress Note  Patient has been seen for Visit count could not be calculated. Make sure you are using a visit which is associated with an episode. sessions    Subjective     Driss Ackerman reports: that he is feeling very fatigued today compared to the prior visit.  He reports that he feels like his strength as improved but he continues to have a hard time with start stopping.     Pain Scale: 0 /10    Objective   See Exercise, Manual, and Modality Logs for complete treatment.       Assessment/Plan  Mr. Ackerman continues to have weakness in his legs and poor balance.  He has made improvements with his strength however he continues to have difficulty with start stopping and has begun more of a shuffling gait.  I have spoken with him before about the [facility] and [facility] program and he seems very interested in it.  We have discussed making today his last day and once he returns from his trip to begin the Big and loud program at [facility] which he was in agreement with.    Other Discharge and progress to [facility] and Loud Program           Therapy Exercise 61598 30 minutes    Timed Treatment:   30   mins   Total Treatment:     45   mins    Ximena Lane PT  Physical Therapist  [place] License # [id number]        Assessment: see above  Progress towards goals: Partially Met    Discharge Reason: Plateau in patient's progress      Plan of Care: Refer to other services (specify):[facility] and L[facility] program    Prognosis: fair    Understanding at Discharge: excellent

## 2017-06-15 NOTE — TELEPHONE ENCOUNTER
Will you let them know that the results were abnormal, indicating that he may have Parkinson's Disease. I would recommend a trial of the medicine, carbidopa/levodopa (Sinemet) as discussed at his appointment. Will you discuss this with his daughter sophy tilley I want to make sure this is clear. If they are okay with starting a trial of the medication as discussed let me know and I will send in the prescription. It will ultimately be 1 tablet three times daily, morning, afternoon, and dinnertime. We   will start lower than that and work our way up.

## 2017-06-16 ENCOUNTER — TELEPHONE (OUTPATIENT)
Dept: INTERNAL MEDICINE | Facility: CLINIC | Age: 77
End: 2017-06-16

## 2017-06-16 RX ORDER — MESALAMINE 1.2 G/1
3.6 TABLET, DELAYED RELEASE ORAL DAILY
Qty: 270 TABLET | Refills: 1 | Status: SHIPPED | OUTPATIENT
Start: 2017-06-16

## 2017-06-16 NOTE — TELEPHONE ENCOUNTER
RX SENT TO PHARMACY    ----- Message from Gena Torres sent at 6/16/2017 10:46 AM EDT -----  Pt needs a 90 day RX sent to [facility] for:  Dejon 1.2 G ED tab

## 2017-07-17 ENCOUNTER — TELEPHONE (OUTPATIENT)
Dept: NEUROLOGY | Facility: CLINIC | Age: 77
End: 2017-07-17

## 2017-07-17 NOTE — TELEPHONE ENCOUNTER
----- Message from Madhuri Nation sent at 7/17/2017  9:08 AM EDT -----  Contact: [phone]  Pt needs a refill on carbidopa-levodopa ([facility]). Pt is doing very well on the medication. He wants to know if Davonte Archibald can up the dosage, he thinks that he will do even better if the medication was increased.

## 2017-07-18 ENCOUNTER — TELEPHONE (OUTPATIENT)
Dept: NEUROLOGY | Facility: CLINIC | Age: 77
End: 2017-07-18

## 2017-07-18 DIAGNOSIS — G20 PARKINSON'S DISEASE (HCC): Primary | ICD-10-CM

## 2017-07-18 NOTE — TELEPHONE ENCOUNTER
----- Message from Madhuri Nation sent at 7/18/2017  9:30 AM EDT -----  Contact: [phone]  Pts wife called and wants to know where do they go from here. She wants to know what the test showed about pt possibly having Parkinson's. They just want to know what are the signs that they should be looking for. Wife stated that they were getting ready for vacation before they was able to have an appointment. Wife is confused about a few things and just wants to clarify what's going on.

## 2017-07-18 NOTE — TELEPHONE ENCOUNTER
Spoke with wife. She is wanting to know what they should be doing now that he has been diagnosed. She was just curious because when he had the MAKEDA scan they went through the parkinsons center at [place]. She noticed they had a PT area & is wanting to know if that is something they should be doing. I think she is wanting a referral to [place]

## 2017-07-18 NOTE — TELEPHONE ENCOUNTER
Spoke with patient. He would like to increase to 1.5 tablets three times daily. He only has 3 days of medication left. He is [facility] & 90 day to [facility]

## 2017-09-19 ENCOUNTER — APPOINTMENT (OUTPATIENT)
Dept: ONCOLOGY | Facility: CLINIC | Age: 77
End: 2017-09-19

## 2017-09-19 ENCOUNTER — APPOINTMENT (OUTPATIENT)
Dept: OTHER | Facility: HOSPITAL | Age: 77
End: 2017-09-19

## 2017-10-17 ENCOUNTER — TELEPHONE (OUTPATIENT)
Dept: INTERNAL MEDICINE | Facility: CLINIC | Age: 77
End: 2017-10-17

## 2017-10-17 NOTE — TELEPHONE ENCOUNTER
FORM AT , PT AWARE    ----- Message from Sanjuana De La Cruz MD sent at 10/17/2017  1:09 PM EDT -----  Regarding: RE: HANDICAP FORM  ok  ----- Message -----     From: Chelsy Butler MA     Sent: 10/17/2017   9:15 AM       To: Sanjuana De La Cruz MD  Subject: FW: HANDICAP FORM                                FORM IN CUBBY TO SIGN  ----- Message -----     From: Josie Acosta     Sent: 10/16/2017  11:40 AM       To: Chelsy Butler MA  Subject: HANDICAP FORM                                    Mr Ackerman called about getting a handicap placard for his car. He wants us to fill out the form and have it signed for him to come and . They are getting ready to go out of town and he would like to have it before they leave. He said his Parkinsons is getting worse and making it tough to get around. Please call him when it is ready to .  Thanks

## 2017-11-07 ENCOUNTER — OFFICE VISIT (OUTPATIENT)
Dept: ONCOLOGY | Facility: CLINIC | Age: 77
End: 2017-11-07

## 2017-11-07 ENCOUNTER — LAB (OUTPATIENT)
Dept: OTHER | Facility: HOSPITAL | Age: 77
End: 2017-11-07

## 2017-11-07 VITALS
SYSTOLIC BLOOD PRESSURE: 116 MMHG | TEMPERATURE: 97.9 F | HEART RATE: 65 BPM | HEIGHT: 72 IN | WEIGHT: 212 LBS | DIASTOLIC BLOOD PRESSURE: 71 MMHG | RESPIRATION RATE: 16 BRPM | BODY MASS INDEX: 28.71 KG/M2 | OXYGEN SATURATION: 98 %

## 2017-11-07 DIAGNOSIS — C91.10 CHRONIC LYMPHOCYTIC LEUKEMIA (HCC): ICD-10-CM

## 2017-11-07 DIAGNOSIS — C91.10 CHRONIC LYMPHOCYTIC LEUKEMIA (HCC): Primary | ICD-10-CM

## 2017-11-07 DIAGNOSIS — R53.82 CHRONIC FATIGUE: ICD-10-CM

## 2017-11-07 DIAGNOSIS — D69.6 THROMBOCYTOPENIA (HCC): ICD-10-CM

## 2017-11-07 LAB
BASOPHILS # BLD AUTO: 0.06 10*3/MM3 (ref 0–0.2)
BASOPHILS NFR BLD AUTO: 0.3 % (ref 0–1.5)
DEPRECATED RDW RBC AUTO: 45.2 FL (ref 37–54)
EOSINOPHIL # BLD AUTO: 0.16 10*3/MM3 (ref 0–0.7)
EOSINOPHIL NFR BLD AUTO: 0.8 % (ref 0.3–6.2)
ERYTHROCYTE [DISTWIDTH] IN BLOOD BY AUTOMATED COUNT: 13.3 % (ref 11.5–14.5)
HCT VFR BLD AUTO: 38 % (ref 40.4–52.2)
HGB BLD-MCNC: 13.1 G/DL (ref 13.7–17.6)
IMM GRANULOCYTES # BLD: 0.05 10*3/MM3 (ref 0–0.03)
IMM GRANULOCYTES NFR BLD: 0.2 % (ref 0–0.5)
LYMPHOCYTES # BLD AUTO: 14.48 10*3/MM3 (ref 0.9–4.8)
LYMPHOCYTES NFR BLD AUTO: 69.2 % (ref 19.6–45.3)
MCH RBC QN AUTO: 32.2 PG (ref 27–32.7)
MCHC RBC AUTO-ENTMCNC: 34.5 G/DL (ref 32.6–36.4)
MCV RBC AUTO: 93.4 FL (ref 79.8–96.2)
MONOCYTES # BLD AUTO: 0.45 10*3/MM3 (ref 0.2–1.2)
MONOCYTES NFR BLD AUTO: 2.2 % (ref 5–12)
NEUTROPHILS # BLD AUTO: 5.72 10*3/MM3 (ref 1.9–8.1)
NEUTROPHILS NFR BLD AUTO: 27.3 % (ref 42.7–76)
NRBC BLD MANUAL-RTO: 0 /100 WBC (ref 0–0)
PLAT MORPH BLD: NORMAL
PLATELET # BLD AUTO: 151 10*3/MM3 (ref 140–500)
PMV BLD AUTO: 9.1 FL (ref 6–12)
RBC # BLD AUTO: 4.07 10*6/MM3 (ref 4.6–6)
RBC MORPH BLD: NORMAL
SMUDGE CELLS BLD QL SMEAR: NORMAL
WBC NRBC COR # BLD: 20.92 10*3/MM3 (ref 4.5–10.7)

## 2017-11-07 PROCEDURE — 85007 BL SMEAR W/DIFF WBC COUNT: CPT | Performed by: INTERNAL MEDICINE

## 2017-11-07 PROCEDURE — 36415 COLL VENOUS BLD VENIPUNCTURE: CPT

## 2017-11-07 PROCEDURE — 99213 OFFICE O/P EST LOW 20 MIN: CPT | Performed by: INTERNAL MEDICINE

## 2017-11-07 PROCEDURE — 85025 COMPLETE CBC W/AUTO DIFF WBC: CPT | Performed by: INTERNAL MEDICINE

## 2017-11-07 RX ORDER — CITALOPRAM 10 MG/1
20 TABLET ORAL
COMMUNITY
Start: 2017-10-04 | End: 2020-07-06 | Stop reason: SDUPTHER

## 2017-11-07 NOTE — PROGRESS NOTES
REASON FOR FOLLOWUP: CHRONIC LYMPHOCYTIC LEUKEMIA. PATEL STAGE II.      HISTORY OF PRESENT ILLNESS:  Driss Ackerman is a 76 y.o. male who comes in today for 12-month followup. The patient is in the company of his wife stating that actually he continues doing well with a terrific appetite after he just returned from vacation, gaining a few pounds of weight. He has not had any fevers, chills, night sweats, pruritus, peripheral adenopathy, rashes in the skin. He has minimal if any back pain and occasional pain in the right ankle for which he underwent surgery more than a year ago.He has normal bowel activity. No passage of blood in the stools. Normal urination with some frequency. No cardiovascular or respiratory issues at this time. Again, no infections and no obvious autoimmunity. He was diagnosed with Parkinson's disease in absence of any tremor in the summer of this year and he is undergoing therapy for this in multiple ways by neurology. He feels better in this regard. He has not had any falls and again he has not had any tremor.              Medical History              Past Medical History   Diagnosis Date   • Actinic keratosis     • Allergic rhinitis     • Anemia     • Arteriosclerosis of carotid artery     • Arthritis     • Atrial fibrillation     • CAD (coronary artery disease)     • Constipation     • Eczema     • Fatigue     • Hearing loss     • HTN (hypertension)     • Hypercholesteremia     • Hypogonadism male     • Leukocytosis     • Memory change     • Myalgia     • Osteoarthritis         ANKLE, FOOT, RIGHT   • Prostatitis     • Ulcerative colitis               Surgical History           Past Surgical History   Procedure Laterality Date   • Coronary artery bypass graft       • Total knee arthroplasty           left 4/2015, right 11/2015        Right ankle arthrodesis with large bone graft and realignment. Right peroneus brevis tendon repair on 4/25/16 by .      HEMATOLOGY/ONCOLOGY HISTORY:    Patient was initially seen on 3/24/2016 for evaluation leukocytosis. The patient previously was seen by Dr. Landon Phillips and since Dr. Phillips left town recently, he has been seeing Dr. De La Cruz. This patient was seen by orthopedic surgeon, Dr. Gonzalez, for planned right ankle surgical procedure. However, because of elevated WBC patient is referred to us for evaluation and his surgery has been on hold.      Patient reports no fever, sweating, chills and especially no night sweats. He has no significant weight losses (he did have some weight loss after the surgery but has regained). The patient otherwise has no B symptoms or other specific complaints.      According to the patient, he was told having elevated WBC counts in October of 2015 when he had right knee replacement. Per computer records, the last time he had normal WBC was back in July of 2013 when he had laboratory study at Dr. Huntley’s office on 07/16/2013. The study reported a total WBC of 5200 including neutrophils 2600 and lymphocytes 2300. His hemoglobin was 14.7 and platelets 173,000. MCV was 102. His chemistry lab reported normal creatinine of 0.71, normal electrolytes and normal liver function panel. His TSH was also normal at 1.28 and vitamin D at 34.8 ng/mL. There were no CBC results available for review until 10/06/2015 when he had total WBC of 12,800 including neutrophils 2900 and lymphocytes 9600. His hemoglobin was 12.4, MCV 91, platelets 171,000. At that time patient had right knee replacement. Chemistry lab showed normal TSH of 1.8 and vitamin D at 29.6 ng/mL.     On April 16 of 2015, laboratory study on the day of surgery reported a total WBC of 13,300 including neutrophils 2400, lymphocytes 10,400, hemoglobin 13.5 and platelets 149,000. MCV was 95. Postop hemoglobin was 10.5 on April 21 2015.      On 3/24/16, the day of his initial oncology evaluation, his total WBC was 10,960, ANC 3000 and lymphocyte 7,210, Platelet 147,000 and hemoglobin  12.3 g/dl. , normal CMP. Serum iron 150, TIBC 321, iron saturation 47, ferritin 60.5 ng/ml, folate 16.6 ng/ml, B12 at 625 pg/ml.      Peripheral blood Flowcytometry study on 3/24/16 reported chronic lymphocytic leukemia. Negative for CD38 and ZAP70 by flow. The CLL panel reported positive for 55.5% cells deletion of chrom 13q14 DLEU1/DLEU2, negative for all the others.      CT scan on 3/29/2016 reported the neck and chest are largely unremarkable. No mass or adenopathy is present at these levels. The spleen is enlarged 16 cm at the craniocaudal extent. There are some mildly prominent lymph nodes in the bilateral inguinal regions with the largest node at the right inguinal level measuring up to 2.7 cm long axis.  . No further adenopathy is identified within the abdomen or pelvis.      SOCIAL HISTORY:  reports that he has never smoked. He does not have any smokeless tobacco history on file. He reports that he does not drink alcohol. His drug history is not on file. Patient is a retired from [employer], worked with the administration office.     FAMILY HISTORY:  family history includes Heart disease in his father and mother. Mother was diagnosed of a chronic leukemia, at about age of 55, she passed away at age of 90 to have received the chemotherapy for her malignancy. Father  at age of 85 and because of heart disease. Patient has no siblings. Patient has no inflammation about the grandparents on both sides.      ALLERGIES:          Allergies   Allergen Reactions   • Penicillins Rash       MEDICATIONS: reviewed and documented on chart, see EMR.        Review of Systems  Constitutional: See history of present illness.   HENT: Negative for congestion, ear discharge, facial swelling, mouth sores, sneezing and sore throat. patient has poor hearing.  Eyes: Negative for photophobia, redness and visual disturbance.   Respiratory: Negative for apnea, cough, chest tightness and shortness of breath.  "  Cardiovascular: Negative for palpitations, chest pain.   Gastrointestinal: Negative for abdominal distention, nausea and vomiting. patient has ulcerative colitis, controlled with medicine. No melena hematochezia  Endocrine: Negative for cold intolerance and polydipsia.   Genitourinary: Negative for difficulty urinating and dysuria.   Musculoskeletal: Has chronic her or see arthritis required a multiple surgery.   Skin: Negative for rash. no bruises no petechiae. Skin warm to touch.  Allergic/Immunologic: Negative for food allergies and immunocompromised state.   Neurological: Negative for dizziness,and headaches.   Hematological: Negative for adenopathy. see history of present illness.   Psychiatric/Behavioral: Negative for sleep disturbance. The patient is not nervous/anxious.      Vitals:    11/07/17 1111   BP: 116/71   Pulse: 65   Resp: 16   Temp: 97.9 °F (36.6 °C)   SpO2: 98%   Weight: 212 lb (96.2 kg)   Height: 72.05\" (183 cm)   PainSc:   3   PainLoc: Ankle     Physical Exam   GENERAL:  Well-developed, well-nourished  Patient  in no acute distress.   SKIN:  Warm, dry without rashes, purpura or petechiae.  HEENT:  Pupils were equal and reactive to light and accomodation, conjunctivas non injected, no pterigion, normal extraocular movements, normal visual acuity.   Mouth mucosa was moist, no exudates in oropharynx, normal gum line, normal roof of the mouth and pillars, normal papillations of the tongue.Ear canals were normal, as well tympanic membranes, normal hearing acuity.No pain in mastoid area or erythema.  NECK:  Supple with good range of motion; no thyromegaly or masses, no JVD or bruits, no cervical adenopathies.No carotid arteries pain, no carotid abnormal pulsation or arterial dance.  LYMPHATICS:  No cervical, supraclavicular, axillary, epitrochlear or inguinal adenopathy.  CHEST:  Normal excursion of both starla thoraces, normal voice fremitus, no subcutaneous emphysema, normal axillas, no rashes or " acanthosis nigricans. Lungs clear to percussion and auscultation, normal breath sounds bilaterally, no wheezing, crackles or ronchi, no stridor, no rubs.  CARDIAC AND VASCULAR: PMI not displaced,no thrills, normal rate and regular rhythm, without murmurs, rubs or S3 or S4 right or left sided gallops. Normal femoral, popliteal, pedis, brachial and carotid pulses.  ABDOMEN:  Soft, nontender with no organomegaly or masses, no ascites, no collateral circulation,no distention,no Juliano sign, no abdominal pain, no inguinal hernias,no umbilical hernias, no abdominal bruits. Normal bowel sounds.  GENITAL: Not  Performed.  EXTREMITIES  AND SPINE:  No clubbing, cyanosis or edema, no deformities or pain .No kyphosis, scoliosis, deformities or pain in spine, ribs or pelvic bone.  NEUROLOGICAL:  Patient was awake, alert, oriented to time, person and place.       DIAGNOSTIC      Lab Results   Component Value Date    WBC 20.92 (H) 11/07/2017    HGB 13.1 (L) 11/07/2017    HCT 38.0 (L) 11/07/2017    MCV 93.4 11/07/2017     11/07/2017     Lab Results   Component Value Date    NEUTROABS 5.72 11/07/2017         lymph  11,250     Lab Results   Component Value Date    GLUCOSE 125 (H) 09/14/2016    BUN 14 05/10/2017    CREATININE 0.76 05/10/2017    EGFRIFNONA 99 05/10/2017    EGFRIFAFRI 121 05/10/2017    BCR 18.4 05/10/2017    CO2 25.0 05/10/2017    CALCIUM 10.0 05/10/2017    PROTENTOTREF 6.9 05/10/2017    ALBUMIN 4.50 05/10/2017    LABIL2 1.9 05/10/2017    AST 21 05/10/2017    ALT 24 05/10/2017     Sodium   Date Value Ref Range Status   05/10/2017 143 136 - 145 mmol/L Final   09/14/2016 143 134 - 145 mmol/L Final     Potassium   Date Value Ref Range Status   05/10/2017 4.3 3.5 - 5.2 mmol/L Final   09/14/2016 4.0 3.5 - 4.7 mmol/L Final     Total Bilirubin   Date Value Ref Range Status   05/10/2017 0.5 0.1 - 1.2 mg/dL Final   09/14/2016 0.5 0.1 - 1.2 mg/dL Final     Alkaline Phosphatase   Date Value Ref Range Status   05/10/2017 79  39 - 117 U/L Final   09/14/2016 81 38 - 116 U/L Final   ]  Lab Results   Component Value Date    IRON 114 03/28/2017    TIBC 335 03/28/2017    FERRITIN 88.70 03/28/2017       ASSESSMENT:  1. Chronic lymphocytic leukemia, Koch stage II (splenomegaly, without severe anemia or thrombocytopenia). 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 No B symptom. The patient has no obvious alteration in the laboratory parameters comparing on how the blood counts were in 04/2015, having persistent leukocytosis, lymphocytosis, normal hemoglobin, normal platelet count. He has no progressive adenopathy. No progressive splenomegaly. No infections and no autoimmunity.       From his leukemia point of view, I told him the issues that he could develop in case that this becomes symptomatic including fever, chills, night sweats, pruritus, lack of appetite, weight loss, rashes in the skin and progressive adenopathy. If that is the case he is supposed to give us a contact call. Otherwise, I will advise him to return to see us in 12 months with a CBC for review. I have not reviewed or changed any medicines, neither prescribed any medicines today.

## 2017-11-09 ENCOUNTER — OFFICE VISIT (OUTPATIENT)
Dept: INTERNAL MEDICINE | Facility: CLINIC | Age: 77
End: 2017-11-09

## 2017-11-09 VITALS
TEMPERATURE: 98 F | DIASTOLIC BLOOD PRESSURE: 62 MMHG | HEART RATE: 79 BPM | SYSTOLIC BLOOD PRESSURE: 108 MMHG | WEIGHT: 209.13 LBS | HEIGHT: 72 IN | BODY MASS INDEX: 28.33 KG/M2 | OXYGEN SATURATION: 98 %

## 2017-11-09 DIAGNOSIS — J06.9 ACUTE URI: Primary | ICD-10-CM

## 2017-11-09 PROCEDURE — 99213 OFFICE O/P EST LOW 20 MIN: CPT | Performed by: NURSE PRACTITIONER

## 2017-11-09 NOTE — PROGRESS NOTES
Subjective   Driss Ackerman is a 77 y.o. male. Patient is here today for   Chief Complaint   Patient presents with   • URI     Pt complains of having a productive cough, ST, green drainage & nasal congestion x4 days. Pt has tried taking Zantac & saline nose spray.    .    History of Present Illness   C/o nasal congestion x 5 days associated with chest congestion, productive cough. Denies fever, shortness of breath or wheezing. He did have a flu vaccine.  He has been using Zyrtec and saline nasal spray. States that he is actually feeling a little better today.    The following portions of the patient's history were reviewed and updated as appropriate: allergies, current medications, past family history, past medical history, past social history, past surgical history and problem list.    Review of Systems   Constitutional: Negative.    HENT: Positive for congestion and sore throat.    Respiratory: Positive for cough. Negative for shortness of breath and wheezing.    Cardiovascular: Negative.    Gastrointestinal: Negative.        Objective   Vitals:    11/09/17 1547   BP: 108/62   Pulse: 79   Temp: 98 °F (36.7 °C)   SpO2: 98%     Physical Exam   Constitutional: Vital signs are normal. He appears well-developed and well-nourished.   HENT:   Right Ear: Tympanic membrane and ear canal normal.   Left Ear: Tympanic membrane and ear canal normal.   Mouth/Throat: Posterior oropharyngeal erythema present.   Cardiovascular: Normal rate, regular rhythm and normal heart sounds.    Pulmonary/Chest: Effort normal and breath sounds normal.   Lymphadenopathy:     He has no cervical adenopathy.   Skin: Skin is warm, dry and intact.   Psychiatric: He has a normal mood and affect. His speech is normal and behavior is normal. Thought content normal.   Nursing note and vitals reviewed.      Assessment/Plan   Driss was seen today for uri.    Diagnoses and all orders for this visit:    Acute URI    Since patient is starting to feel  better, there is no need for antibiotics at this point.  Patient is to continue with over-the-counter medicines, increase fluids and rest.  He will call if his symptoms do not resolve.

## 2017-11-16 RX ORDER — ATORVASTATIN CALCIUM 10 MG/1
TABLET, FILM COATED ORAL
Qty: 90 TABLET | Refills: 1 | Status: SHIPPED | OUTPATIENT
Start: 2017-11-16 | End: 2019-07-11

## 2018-03-26 RX ORDER — METOPROLOL SUCCINATE 25 MG/1
TABLET, EXTENDED RELEASE ORAL
Qty: 90 TABLET | Refills: 1 | Status: SHIPPED | OUTPATIENT
Start: 2018-03-26 | End: 2018-06-13

## 2018-06-13 ENCOUNTER — OFFICE VISIT (OUTPATIENT)
Dept: INTERNAL MEDICINE | Facility: CLINIC | Age: 78
End: 2018-06-13

## 2018-06-13 VITALS
HEIGHT: 72 IN | TEMPERATURE: 98.2 F | WEIGHT: 208 LBS | OXYGEN SATURATION: 96 % | HEART RATE: 73 BPM | SYSTOLIC BLOOD PRESSURE: 118 MMHG | DIASTOLIC BLOOD PRESSURE: 72 MMHG | BODY MASS INDEX: 28.17 KG/M2

## 2018-06-13 DIAGNOSIS — I48.91 ATRIAL FIBRILLATION, UNSPECIFIED TYPE (HCC): ICD-10-CM

## 2018-06-13 DIAGNOSIS — G47.33 OSA ON CPAP: ICD-10-CM

## 2018-06-13 DIAGNOSIS — E78.00 HYPERCHOLESTEROLEMIA: ICD-10-CM

## 2018-06-13 DIAGNOSIS — I10 BENIGN ESSENTIAL HYPERTENSION: ICD-10-CM

## 2018-06-13 DIAGNOSIS — Z99.89 OSA ON CPAP: ICD-10-CM

## 2018-06-13 DIAGNOSIS — Z00.00 MEDICARE ANNUAL WELLNESS VISIT, INITIAL: ICD-10-CM

## 2018-06-13 DIAGNOSIS — G20 PARKINSON DISEASE (HCC): Primary | ICD-10-CM

## 2018-06-13 PROBLEM — G20.A1 PARKINSON DISEASE: Status: ACTIVE | Noted: 2018-06-13

## 2018-06-13 PROCEDURE — G0009 ADMIN PNEUMOCOCCAL VACCINE: HCPCS | Performed by: INTERNAL MEDICINE

## 2018-06-13 PROCEDURE — 90670 PCV13 VACCINE IM: CPT | Performed by: INTERNAL MEDICINE

## 2018-06-13 PROCEDURE — G0438 PPPS, INITIAL VISIT: HCPCS | Performed by: INTERNAL MEDICINE

## 2018-06-13 PROCEDURE — 99214 OFFICE O/P EST MOD 30 MIN: CPT | Performed by: INTERNAL MEDICINE

## 2018-06-13 RX ORDER — CARBIDOPA/LEVODOPA 25MG-250MG
1.5 TABLET ORAL 4 TIMES DAILY
COMMUNITY
Start: 2018-04-10

## 2018-06-13 RX ORDER — FLUOROURACIL 50 MG/G
CREAM TOPICAL
COMMUNITY
Start: 2018-04-23 | End: 2019-12-09

## 2018-06-13 RX ORDER — FLUTICASONE PROPIONATE 50 MCG
2 SPRAY, SUSPENSION (ML) NASAL DAILY
Qty: 3 BOTTLE | Refills: 1 | Status: SHIPPED | OUTPATIENT
Start: 2018-06-13

## 2018-06-13 NOTE — PROGRESS NOTES
QUICK REFERENCE INFORMATION:  The ABCs of the Annual Wellness Visit    Initial Medicare Wellness Visit    HEALTH RISK ASSESSMENT    1940    Recent Hospitalizations:  No hospitalization(s) within the last year..        Current Medical Providers:  Patient Care Team:  Sanjuana De La Cruz MD as PCP - General  Sanjuana De La Cruz MD as Referring Physician (Internal Medicine)  Alvin Jackman MD as Consulting Physician (Hematology and Oncology)        Smoking Status:  History   Smoking Status   • Never Smoker   Smokeless Tobacco   • Never Used       Alcohol Consumption:  History   Alcohol Use No       Depression Screen:   PHQ-2/PHQ-9 Depression Screening 6/13/2018   Little interest or pleasure in doing things 0   Feeling down, depressed, or hopeless 1   Trouble falling or staying asleep, or sleeping too much 0   Feeling tired or having little energy 1   Poor appetite or overeating 0   Feeling bad about yourself - or that you are a failure or have let yourself or your family down 0   Trouble concentrating on things, such as reading the newspaper or watching television 2   Moving or speaking so slowly that other people could have noticed. Or the opposite - being so fidgety or restless that you have been moving around a lot more than usual 0   Thoughts that you would be better off dead, or of hurting yourself in some way 0   Total Score 4   If you checked off any problems, how difficult have these problems made it for you to do your work, take care of things at home, or get along with other people? Not difficult at all       Health Habits and Functional and Cognitive Screening:  Functional & Cognitive Status 6/13/2018   Do you have difficulty preparing food and eating? No   Do you have difficulty bathing yourself, getting dressed or grooming yourself? No   Do you have difficulty using the toilet? No   Do you have difficulty moving around from place to place? No   Do you have trouble with steps or getting out of a bed or a chair? Yes    In the past year have you fallen or experienced a near fall? No   Current Diet Well Balanced Diet   Dental Exam Up to date   Eye Exam Not up to date   Exercise (times per week) 3 times per week   Current Exercise Activities Include Weightlifting   Do you need help using the phone?  No   Are you deaf or do you have serious difficulty hearing?  Yes   Do you need help with transportation? No   Do you need help shopping? No   Do you need help preparing meals?  No   Do you need help with housework?  No   Do you need help with laundry? No   Do you need help taking your medications? No   Do you need help managing money? No   Do you ever drive or ride in a car without wearing a seat belt? No   Have you felt unusual stress, anger or loneliness in the last month? No   Who do you live with? Spouse   If you need help, do you have trouble finding someone available to you? No   Have you been bothered in the last four weeks by sexual problems? Yes   Do you have difficulty concentrating, remembering or making decisions? Yes           Does the patient have evidence of cognitive impairment? Yes  Memory getting owrse  Seeing neuro  Asiprin use counseling: Taking ASA appropriately as indicated      Recent Lab Results:    Visual Acuity:  No exam data present    Age-appropriate Screening Schedule:  Refer to the list below for future screening recommendations based on patient's age, sex and/or medical conditions. Orders for these recommended tests are listed in the plan section. The patient has been provided with a written plan.    Health Maintenance   Topic Date Due   • TDAP/TD VACCINES (1 - Tdap) 03/17/1959   • ZOSTER VACCINE (2 of 3) 05/06/2011   • PNEUMOCOCCAL VACCINES (65+ LOW/MEDIUM RISK) (2 of 2 - PCV13) 03/11/2012   • LIPID PANEL  05/10/2018   • INFLUENZA VACCINE  08/01/2018        Subjective   History of Present Illness    Driss Ackerman is a 78 y.o. male who presents for an Annual Wellness Visit.    The following portions of  the patient's history were reviewed and updated as appropriate: allergies, current medications, past family history, past medical history, past social history, past surgical history and problem list.    Outpatient Medications Prior to Visit   Medication Sig Dispense Refill   • acetaminophen (TYLENOL) 500 MG tablet Take 500 mg by mouth Daily.     • aspirin 81 MG EC tablet Take 81 mg by mouth Daily.     • atorvastatin (LIPITOR) 10 MG tablet TAKE 1 TABLET DAILY 90 tablet 1   • citalopram (CeleXA) 10 MG tablet      • Coenzyme Q10 (CO Q 10) 100 MG capsule Take 1 tablet by mouth Daily.     • Cyanocobalamin (VITAMIN B-12 ER) 2000 MCG tablet controlled-release Place 1 tablet under the tongue Daily.     • Docusate Calcium (STOOL SOFTENER PO) Take  by mouth Daily.     • mesalamine (LIALDA) 1.2 G EC tablet Take 3 tablets by mouth Daily. 270 tablet 1   • Multiple Vitamins-Minerals (MULTIVITAMIN PO) Take 1 tablet by mouth daily.     • Omega-3 Fatty Acids (FISH OIL) 1000 MG capsule capsule Take  by mouth daily with breakfast.     • metoprolol succinate XL (TOPROL-XL) 25 MG 24 hr tablet TAKE 1 TABLET DAILY 90 tablet 1   • ramipril (ALTACE) 2.5 MG capsule Take 2.5 mg by mouth Daily.     • carbidopa-levodopa (SINEMET)  MG per tablet Take 1.5 tablets by mouth three times daily 405 tablet 1     No facility-administered medications prior to visit.        Patient Active Problem List   Diagnosis   • Chronic coronary artery disease   • Arthritis   • Atrial fibrillation   • Benign prostatic hyperplasia with urinary obstruction   • Fatigue   • Hypercholesterolemia   • Hypogonadism in male   • Memory impairment   • Ulcerative colitis   • Vitamin D deficiency   • Atopic rhinitis   • Benign essential hypertension   • Muscle pain   • Chronic lymphocytic leukemia   • Thrombocytopenia   • Ulcerative proctitis with complication   • Parkinson disease   • PAVEL on CPAP       Advance Care Planning:  has an advance directive - a copy HAS NOT been  "provided. Have asked the patient to send this to us to add to record.    Identification of Risk Factors:  Risk factors include: increased fall risk, cognitive impairment and incontinence.    Review of Systems    Compared to one year ago, the patient feels his physical health is worse.  Compared to one year ago, the patient feels his mental health is worse.    Objective     Physical Exam    Vitals:    06/13/18 1046   BP: 118/72   Pulse: 73   Temp: 98.2 °F (36.8 °C)   SpO2: 96%   Weight: 94.3 kg (208 lb)   Height: 182.9 cm (72.01\")   PainSc:   2       Patient's Body mass index is 28.2 kg/m². BMI is within normal parameters. No follow-up required.      Assessment/Plan   Patient Self-Management and Personalized Health Advice  The patient has been provided with information about: diet, exercise, the relationship between weight and GERD and fall prevention and preventive services including:   · Exercise counseling provided, Nutrition counseling provided.    Visit Diagnoses:    ICD-10-CM ICD-9-CM   1. Parkinson disease G20 332.0   2. Hypercholesterolemia E78.00 272.0   3. PAVEL on CPAP G47.33 327.23    Z99.89 V46.8   4. Atrial fibrillation, unspecified type I48.91 427.31   5. Medicare annual wellness visit, initial Z00.00 V70.0   6. Benign essential hypertension I10 401.1       Orders Placed This Encounter   Procedures   • Comprehensive Metabolic Panel   • LP+LDL / HDL Ratio ([facility])   • TSH Rfx On Abnormal To Free T4   • CBC & Differential     Order Specific Question:   Manual Differential     Answer:   No       Outpatient Encounter Prescriptions as of 6/13/2018   Medication Sig Dispense Refill   • acetaminophen (TYLENOL) 500 MG tablet Take 500 mg by mouth Daily.     • aspirin 81 MG EC tablet Take 81 mg by mouth Daily.     • atorvastatin (LIPITOR) 10 MG tablet TAKE 1 TABLET DAILY 90 tablet 1   • carbidopa-levodopa (SINEMET)  MG per tablet Take 1 tablet by mouth.     • citalopram (CeleXA) 10 MG tablet      • Coenzyme " Q10 (CO Q 10) 100 MG capsule Take 1 tablet by mouth Daily.     • Cyanocobalamin (VITAMIN B-12 ER) 2000 MCG tablet controlled-release Place 1 tablet under the tongue Daily.     • Docusate Calcium (STOOL SOFTENER PO) Take  by mouth Daily.     • fluorouracil (EFUDEX) 5 % cream      • mesalamine (LIALDA) 1.2 G EC tablet Take 3 tablets by mouth Daily. 270 tablet 1   • Multiple Vitamins-Minerals (MULTIVITAMIN PO) Take 1 tablet by mouth daily.     • Omega-3 Fatty Acids (FISH OIL) 1000 MG capsule capsule Take  by mouth daily with breakfast.     • [DISCONTINUED] metoprolol succinate XL (TOPROL-XL) 25 MG 24 hr tablet TAKE 1 TABLET DAILY 90 tablet 1   • [DISCONTINUED] ramipril (ALTACE) 2.5 MG capsule Take 2.5 mg by mouth Daily.     • fluticasone (FLONASE) 50 MCG/ACT nasal spray 2 sprays into each nostril Daily. 3 bottle 1   • [DISCONTINUED] carbidopa-levodopa (SINEMET)  MG per tablet Take 1.5 tablets by mouth three times daily 405 tablet 1     No facility-administered encounter medications on file as of 6/13/2018.        Reviewed use of high risk medication in the elderly: yes  Reviewed for potential of harmful drug interactions in the elderly: yes    Follow Up:  Return in about 6 months (around 12/13/2018).     An After Visit Summary and PPPS with all of these plans were given to the patient.    HE is having issues with Parkinson's and that is affecting his physical and mental health.  adressed by neuro  Continuing to work on balance with PT  He is very careful on steps  But counseled on this

## 2018-06-13 NOTE — PROGRESS NOTES
Subjective   Driss Ackerman is a 78 y.o. male. Wants to discuss medication. He is not sure that he has been taking his medications correctly.     History of Present Illness   He has been having some occas orthostasis..  HE has been dx with parkinsons and he is seeing neuro  He has been on celexa and it does seen to help him.  He is struggling with walking.  HE does cont to have some trouble with memory  He is seeing neuro  He has been on the cpap and doing well  He has been to PT re balance and falls  He is still doing the exercises  HE has been for speech as well      The following portions of the patient's history were reviewed and updated as appropriate: allergies, current medications, past medical history, past social history and problem list.  He is eating ok  He is exercising reg but has a hard time working up a sweat    Review of Systems   Musculoskeletal:        Imbalance   Neurological: Positive for weakness and light-headedness.   All other systems reviewed and are negative.      Objective    Vitals:    06/13/18 1046   BP: 118/72   Pulse: 73   Temp: 98.2 °F (36.8 °C)   SpO2: 96%       Physical Exam   Constitutional: He is oriented to person, place, and time. He appears well-developed and well-nourished.   HENT:   Head: Normocephalic and atraumatic.   Right Ear: External ear normal.   Left Ear: External ear normal.   Mouth/Throat: Oropharynx is clear and moist.   Eyes: Conjunctivae and EOM are normal. Pupils are equal, round, and reactive to light.   Neck: Normal range of motion. No tracheal deviation present. No thyromegaly present.   Cardiovascular: Normal rate, regular rhythm, normal heart sounds and intact distal pulses.    Pulmonary/Chest: Effort normal and breath sounds normal.   Abdominal: Soft. Bowel sounds are normal. He exhibits no distension. There is no tenderness.   Musculoskeletal: Normal range of motion. He exhibits no edema or deformity.   Neurological: He is alert and oriented to person,  place, and time.   Skin: Skin is warm and dry.   Psychiatric: He has a normal mood and affect. His behavior is normal. Judgment and thought content normal.   Vitals reviewed.      Assessment/Plan   Diagnoses and all orders for this visit:    Parkinson disease  -     Comprehensive Metabolic Panel  -     LP+LDL / HDL Ratio ([facility])  -     TSH Rfx On Abnormal To Free T4  -     CBC & Differential    Hypercholesterolemia  -     Comprehensive Metabolic Panel  -     LP+LDL / HDL Ratio ([facility])  -     TSH Rfx On Abnormal To Free T4  -     CBC & Differential    PAVEL on CPAP    Atrial fibrillation, unspecified type  -     Comprehensive Metabolic Panel  -     LP+LDL / HDL Ratio ([facility])  -     TSH Rfx On Abnormal To Free T4  -     CBC & Differential    Medicare annual wellness visit, initial    Benign essential hypertension    Other orders  -     fluticasone (FLONASE) 50 MCG/ACT nasal spray; 2 sprays into each nostril Daily.      1. Parkinsosn-  He is having a lot of the complications from this  He is following with neuro on this.  HE has PT exercises he is working on  2.  HPL- ok with lipitor  Needs to be rechecked  3.  PAVEL-stable with the CPAP  4.  CAD- ok with current meds  No chest pain  5. HTN- Stay off the ramipril and metoprolol.  He has been off them for a few weeks and has been fine  He needs to be cautious on standing

## 2018-06-13 NOTE — PATIENT INSTRUCTIONS
Fall Prevention in the Home  Falls can cause injuries. They can happen to people of all ages. There are many things you can do to make your home safe and to help prevent falls.  What can I do on the outside of my home?  · Regularly fix the edges of walkways and driveways and fix any cracks.  · Remove anything that might make you trip as you walk through a door, such as a raised step or threshold.  · Trim any bushes or trees on the path to your home.  · Use bright outdoor lighting.  · Clear any walking paths of anything that might make someone trip, such as rocks or tools.  · Regularly check to see if handrails are loose or broken. Make sure that both sides of any steps have handrails.  · Any raised decks and porches should have guardrails on the edges.  · Have any leaves, snow, or ice cleared regularly.  · Use sand or salt on walking paths during winter.  · Clean up any spills in your garage right away. This includes oil or grease spills.  What can I do in the bathroom?  · Use night lights.  · Install grab bars by the toilet and in the tub and shower. Do not use towel bars as grab bars.  · Use non-skid mats or decals in the tub or shower.  · If you need to sit down in the shower, use a plastic, non-slip stool.  · Keep the floor dry. Clean up any water that spills on the floor as soon as it happens.  · Remove soap buildup in the tub or shower regularly.  · Attach bath mats securely with double-sided non-slip rug tape.  · Do not have throw rugs and other things on the floor that can make you trip.  What can I do in the bedroom?  · Use night lights.  · Make sure that you have a light by your bed that is easy to reach.  · Do not use any sheets or blankets that are too big for your bed. They should not hang down onto the floor.  · Have a firm chair that has side arms. You can use this for support while you get dressed.  · Do not have throw rugs and other things on the floor that can make you trip.  What can I do in the  kitchen?  · Clean up any spills right away.  · Avoid walking on wet floors.  · Keep items that you use a lot in easy-to-reach places.  · If you need to reach something above you, use a strong step stool that has a grab bar.  · Keep electrical cords out of the way.  · Do not use floor polish or wax that makes floors slippery. If you must use wax, use non-skid floor wax.  · Do not have throw rugs and other things on the floor that can make you trip.  What can I do with my stairs?  · Do not leave any items on the stairs.  · Make sure that there are handrails on both sides of the stairs and use them. Fix handrails that are broken or loose. Make sure that handrails are as long as the stairways.  · Check any carpeting to make sure that it is firmly attached to the stairs. Fix any carpet that is loose or worn.  · Avoid having throw rugs at the top or bottom of the stairs. If you do have throw rugs, attach them to the floor with carpet tape.  · Make sure that you have a light switch at the top of the stairs and the bottom of the stairs. If you do not have them, ask someone to add them for you.  What else can I do to help prevent falls?  · Wear shoes that:  ¨ Do not have high heels.  ¨ Have rubber bottoms.  ¨ Are comfortable and fit you well.  ¨ Are closed at the toe. Do not wear sandals.  · If you use a stepladder:  ¨ Make sure that it is fully opened. Do not climb a closed stepladder.  ¨ Make sure that both sides of the stepladder are locked into place.  ¨ Ask someone to hold it for you, if possible.  · Clearly will and make sure that you can see:  ¨ Any grab bars or handrails.  ¨ First and last steps.  ¨ Where the edge of each step is.  · Use tools that help you move around (mobility aids) if they are needed. These include:  ¨ Canes.  ¨ Walkers.  ¨ Scooters.  ¨ Crutches.  · Turn on the lights when you go into a dark area. Replace any light bulbs as soon as they burn out.  · Set up your furniture so you have a clear path.  Avoid moving your furniture around.  · If any of your floors are uneven, fix them.  · If there are any pets around you, be aware of where they are.  · Review your medicines with your doctor. Some medicines can make you feel dizzy. This can increase your chance of falling.  Ask your doctor what other things that you can do to help prevent falls.  This information is not intended to replace advice given to you by your health care provider. Make sure you discuss any questions you have with your health care provider.  Document Released: 10/14/2010 Document Revised: 2017 Document Reviewed: 2016  [employer] Interactive Patient Education © 2017 [employer]    Medicare Wellness  Personal Prevention Plan of Service     Date of Office Visit:  2018  Encounter Provider:  Sanjuana De La Cruz MD  Place of Service:  [facility]  Patient Name: Driss Ackerman  :  1940    As part of the Medicare Wellness portion of your visit today, we are providing you with this personalized preventive plan of services (PPPS). This plan is based upon recommendations of the [place] Preventive Services Task Force (USPSTF) and the Advisory Committee on Immunization Practices (ACIP).    This lists the preventive care services that should be considered, and provides dates of when you are due. Items listed as completed are up-to-date and do not require any further intervention.    Health Maintenance   Topic Date Due   • TDAP/TD VACCINES (1 - Tdap) 1959   • ZOSTER VACCINE (2 of 3) 2011   • PNEUMOCOCCAL VACCINES (65+ LOW/MEDIUM RISK) (2 of 2 - PCV13) 2012   • MEDICARE ANNUAL WELLNESS  2016   • LIPID PANEL  05/10/2018   • INFLUENZA VACCINE  2018       Orders Placed This Encounter   Procedures   • Comprehensive Metabolic Panel   • LP+LDL / HDL Ratio ([facility])   • TSH Rfx On Abnormal To Free T4   • CBC & Differential     Order Specific Question:   Manual Differential      Answer:   No       Return in about 6 months (around 12/13/2018).

## 2018-06-15 LAB
ALBUMIN SERPL-MCNC: 4.7 G/DL (ref 3.5–5.2)
ALBUMIN/GLOB SERPL: 2.8 G/DL
ALP SERPL-CCNC: 69 U/L (ref 39–117)
ALT SERPL-CCNC: 17 U/L (ref 1–41)
AST SERPL-CCNC: 15 U/L (ref 1–40)
BASOPHILS # BLD MANUAL: 0 10*3/MM3 (ref 0–0.2)
BASOPHILS NFR BLD MANUAL: 0 % (ref 0–1.5)
BILIRUB SERPL-MCNC: 0.5 MG/DL (ref 0.1–1.2)
BUN SERPL-MCNC: 17 MG/DL (ref 8–23)
BUN/CREAT SERPL: 21.8 (ref 7–25)
CALCIUM SERPL-MCNC: 9.6 MG/DL (ref 8.6–10.5)
CHLORIDE SERPL-SCNC: 104 MMOL/L (ref 98–107)
CHOLEST SERPL-MCNC: 129 MG/DL (ref 0–200)
CO2 SERPL-SCNC: 26.7 MMOL/L (ref 22–29)
CREAT SERPL-MCNC: 0.78 MG/DL (ref 0.76–1.27)
DIFFERENTIAL COMMENT: ABNORMAL
EOSINOPHIL # BLD MANUAL: 0.18 10*3/MM3 (ref 0–0.7)
EOSINOPHIL NFR BLD MANUAL: 1 % (ref 0.3–6.2)
ERYTHROCYTE [DISTWIDTH] IN BLOOD BY AUTOMATED COUNT: 13.8 % (ref 11.5–14.5)
GFR SERPLBLD CREATININE-BSD FMLA CKD-EPI: 117 ML/MIN/1.73
GFR SERPLBLD CREATININE-BSD FMLA CKD-EPI: 96 ML/MIN/1.73
GLOBULIN SER CALC-MCNC: 1.7 GM/DL
GLUCOSE SERPL-MCNC: 89 MG/DL (ref 65–99)
HCT VFR BLD AUTO: 41.5 % (ref 40.4–52.2)
HDLC SERPL-MCNC: 39 MG/DL (ref 40–60)
HGB BLD-MCNC: 13.4 G/DL (ref 13.7–17.6)
LDLC SERPL CALC-MCNC: 72 MG/DL (ref 0–100)
LDLC/HDLC SERPL: 1.84 {RATIO}
LYMPHOCYTES # BLD MANUAL: 16.15 10*3/MM3 (ref 0.9–4.8)
LYMPHOCYTES NFR BLD MANUAL: 89 % (ref 19.6–45.3)
MCH RBC QN AUTO: 32.7 PG (ref 27–32.7)
MCHC RBC AUTO-ENTMCNC: 32.3 G/DL (ref 32.6–36.4)
MCV RBC AUTO: 101.2 FL (ref 79.8–96.2)
MONOCYTES # BLD MANUAL: 0 10*3/MM3 (ref 0.2–1.2)
MONOCYTES NFR BLD MANUAL: 0 % (ref 5–12)
NEUTROPHILS # BLD MANUAL: 1.82 10*3/MM3 (ref 1.9–8.1)
NEUTROPHILS NFR BLD MANUAL: 10 % (ref 42.7–76)
PLATELET # BLD AUTO: 160 10*3/MM3 (ref 140–500)
PLATELET BLD QL SMEAR: ABNORMAL
POTASSIUM SERPL-SCNC: 4.3 MMOL/L (ref 3.5–5.2)
PROT SERPL-MCNC: 6.4 G/DL (ref 6–8.5)
RBC # BLD AUTO: 4.1 10*6/MM3 (ref 4.6–6)
RBC MORPH BLD: ABNORMAL
SODIUM SERPL-SCNC: 143 MMOL/L (ref 136–145)
TRIGL SERPL-MCNC: 92 MG/DL (ref 0–150)
TSH SERPL DL<=0.005 MIU/L-ACNC: 1.54 MIU/ML (ref 0.27–4.2)
VLDLC SERPL CALC-MCNC: 18.4 MG/DL (ref 5–40)
WBC # BLD AUTO: 18.15 10*3/MM3 (ref 4.5–10.7)

## 2018-06-22 ENCOUNTER — TELEPHONE (OUTPATIENT)
Dept: INTERNAL MEDICINE | Facility: CLINIC | Age: 78
End: 2018-06-22

## 2018-06-22 NOTE — TELEPHONE ENCOUNTER
PT AWARE. LABS FAXED TO DR [facility]OLDSTEIN OFFICE    ----- Message from JOSUE Wallace sent at 6/22/2018 11:38 AM EDT -----  Send results to his hematologist who is following him for this.     ----- Message -----  From: Chelsy Butler MA  Sent: 6/22/2018  10:55 AM  To: JOSUE Wallace    THE MANUAL DIFFERENTIAL WAS DONE, ITS SEPERATED  ----- Message -----  From: JOSUE Wallace  Sent: 6/22/2018  10:46 AM  To: Chelsy Butler MA    He had a CBC done, but not a differential. The CBC looks stable/slightly improved from last time it was checked.     ----- Message -----  From: Chelsy Butler MA  Sent: 6/22/2018   9:22 AM  To: JOSUE Wallace    PT WAS CURIOUS ABOUT HIS CBC RESULT. HE TOLD ME HE HAS A LOW GRADE LEUKEMIA THAT HE SEE HEMATOLOGIST FOR. CAN YOU REVIEW AND ADVISE.

## 2018-12-12 ENCOUNTER — OFFICE VISIT (OUTPATIENT)
Dept: INTERNAL MEDICINE | Facility: CLINIC | Age: 78
End: 2018-12-12

## 2018-12-12 VITALS
BODY MASS INDEX: 27.62 KG/M2 | HEIGHT: 72 IN | DIASTOLIC BLOOD PRESSURE: 80 MMHG | WEIGHT: 203.9 LBS | OXYGEN SATURATION: 98 % | TEMPERATURE: 97.9 F | HEART RATE: 70 BPM | SYSTOLIC BLOOD PRESSURE: 122 MMHG

## 2018-12-12 DIAGNOSIS — G20 PARKINSON DISEASE (HCC): ICD-10-CM

## 2018-12-12 DIAGNOSIS — C91.10 CHRONIC LYMPHOCYTIC LEUKEMIA (HCC): ICD-10-CM

## 2018-12-12 DIAGNOSIS — I48.91 ATRIAL FIBRILLATION, UNSPECIFIED TYPE (HCC): ICD-10-CM

## 2018-12-12 DIAGNOSIS — I10 BENIGN ESSENTIAL HYPERTENSION: Primary | ICD-10-CM

## 2018-12-12 PROBLEM — Z86.0100 HISTORY OF COLONIC POLYPS: Status: ACTIVE | Noted: 2018-08-27

## 2018-12-12 PROBLEM — R45.3 APATHY: Status: ACTIVE | Noted: 2018-10-19

## 2018-12-12 PROBLEM — Z86.010 HISTORY OF COLONIC POLYPS: Status: ACTIVE | Noted: 2018-08-27

## 2018-12-12 PROBLEM — K11.7 SIALORRHEA: Status: ACTIVE | Noted: 2018-10-19

## 2018-12-12 PROCEDURE — 99214 OFFICE O/P EST MOD 30 MIN: CPT | Performed by: INTERNAL MEDICINE

## 2018-12-12 RX ORDER — METOPROLOL SUCCINATE 25 MG/1
25 TABLET, EXTENDED RELEASE ORAL DAILY
Qty: 90 TABLET | Refills: 1 | Status: SHIPPED | OUTPATIENT
Start: 2018-12-12 | End: 2019-12-09

## 2018-12-12 NOTE — PROGRESS NOTES
Subjective   Driss Ackerman is a 78 y.o. male here to follow up on htn, he is off od metoporol and lipitor he says, he wants to go back.  Pt state he is light headed, he says his bp goes up and down sometimes. he is doing physical threapy for his knees and foot.    History of Present Illness   He does have a lot of trouble with orthostasis.  He is cautious at PT    He is struggling with some memory issues with the parkinsonsHe had been on toprol and since stopping that he has been having some occas chest discomfort.  He would like to start this back  He has been off the lipitor  He does not want to take the meds  Pt has been stable on current meds for atrial fibrillation.  No palp or SOB.  No CP or edema    The following portions of the patient's history were reviewed and updated as appropriate: allergies, current medications, past medical history, past social history and problem list.  He does eat more chocolate  He has been doing nakia chi  Review of Systems   Constitutional: Positive for fatigue.   All other systems reviewed and are negative.      Objective   Physical Exam   Constitutional: He is oriented to person, place, and time. He appears well-developed and well-nourished.   HENT:   Head: Normocephalic and atraumatic.   Right Ear: External ear normal.   Left Ear: External ear normal.   Mouth/Throat: Oropharynx is clear and moist.   Eyes: Conjunctivae and EOM are normal. Pupils are equal, round, and reactive to light.   Neck: Normal range of motion. No tracheal deviation present. No thyromegaly present.   Cardiovascular: Normal rate, regular rhythm, normal heart sounds and intact distal pulses.   Pulmonary/Chest: Effort normal and breath sounds normal.   Abdominal: Soft. Bowel sounds are normal. He exhibits no distension. There is no tenderness.   Musculoskeletal: Normal range of motion. He exhibits no edema or deformity.   Neurological: He is alert and oriented to person, place, and time.   Skin: Skin is  warm and dry.   Psychiatric: He has a normal mood and affect. His behavior is normal. Judgment and thought content normal.   Vitals reviewed.      Vitals:    12/12/18 1146   BP: 122/80   Pulse: 70   Temp: 97.9 °F (36.6 °C)   SpO2: 98%     Current Outpatient Medications:   •  acetaminophen (TYLENOL) 500 MG tablet, Take 500 mg by mouth Daily., Disp: , Rfl:   •  aspirin 81 MG EC tablet, Take 81 mg by mouth Daily., Disp: , Rfl:   •  carbidopa-levodopa (SINEMET)  MG per tablet, Take 1 tablet by mouth., Disp: , Rfl:   •  citalopram (CeleXA) 10 MG tablet, , Disp: , Rfl:   •  Coenzyme Q10 (CO Q 10) 100 MG capsule, Take 1 tablet by mouth Daily., Disp: , Rfl:   •  Cyanocobalamin (VITAMIN B-12 ER) 2000 MCG tablet controlled-release, Place 1 tablet under the tongue Daily., Disp: , Rfl:   •  Docusate Calcium (STOOL SOFTENER PO), Take  by mouth Daily., Disp: , Rfl:   •  fluticasone (FLONASE) 50 MCG/ACT nasal spray, 2 sprays into each nostril Daily., Disp: 3 bottle, Rfl: 1  •  mesalamine (LIALDA) 1.2 G EC tablet, Take 3 tablets by mouth Daily. (Patient taking differently: Take 3.6 g by mouth Daily. Pt takes 4 tabs a day), Disp: 270 tablet, Rfl: 1  •  Multiple Vitamins-Minerals (MULTIVITAMIN PO), Take 1 tablet by mouth daily., Disp: , Rfl:   •  Omega-3 Fatty Acids (FISH OIL) 1000 MG capsule capsule, Take  by mouth daily with breakfast., Disp: , Rfl:   •  atorvastatin (LIPITOR) 10 MG tablet, TAKE 1 TABLET DAILY, Disp: 90 tablet, Rfl: 1  •  fluorouracil (EFUDEX) 5 % cream, , Disp: , Rfl:   •  metoprolol succinate XL (TOPROL XL) 25 MG 24 hr tablet, Take 1 tablet by mouth Daily., Disp: 90 tablet, Rfl: 1         Assessment/Plan   Diagnoses and all orders for this visit:    Benign essential hypertension  -     Comprehensive Metabolic Panel  -     LP+LDL / HDL Ratio ([facility])  -     TSH Rfx On Abnormal To Free T4    Parkinson disease (CMS/HCC)    Atrial fibrillation, unspecified type (CMS/HCC)  -     Comprehensive Metabolic Panel  -      LP+LDL / HDL Ratio ([facility])  -     TSH Rfx On Abnormal To Free T4    Chronic lymphocytic leukemia (CMS/HCC)  -     CBC & Differential    Other orders  -     metoprolol succinate XL (TOPROL XL) 25 MG 24 hr tablet; Take 1 tablet by mouth Daily.      1. Parkinsons-  He is still getting PT  He has noticed some trouble with balance and orthostasis  2.  AF- stable but off the toprol he does have some occas chest sx.  We will resume the torpol xl 1/2 tab at night only  3.  HTN-  He does occas run high but we cannot have him dropping too low as he is a fall rsik.  We will follow  4. CLL- stable  We will recheck the cbc and fu with heme as needed

## 2018-12-14 LAB
ALBUMIN SERPL-MCNC: 4.5 G/DL (ref 3.5–5.2)
ALBUMIN/GLOB SERPL: 2.3 G/DL
ALP SERPL-CCNC: 72 U/L (ref 39–117)
ALT SERPL-CCNC: 10 U/L (ref 1–41)
AST SERPL-CCNC: 19 U/L (ref 1–40)
BASOPHILS # BLD MANUAL: 0 10*3/MM3 (ref 0–0.2)
BASOPHILS NFR BLD MANUAL: 0 % (ref 0–1.5)
BILIRUB SERPL-MCNC: 0.5 MG/DL (ref 0.1–1.2)
BUN SERPL-MCNC: 13 MG/DL (ref 8–23)
BUN/CREAT SERPL: 17.3 (ref 7–25)
CALCIUM SERPL-MCNC: 9.7 MG/DL (ref 8.6–10.5)
CHLORIDE SERPL-SCNC: 104 MMOL/L (ref 98–107)
CHOLEST SERPL-MCNC: 160 MG/DL (ref 0–200)
CO2 SERPL-SCNC: 27.6 MMOL/L (ref 22–29)
CREAT SERPL-MCNC: 0.75 MG/DL (ref 0.76–1.27)
DIFFERENTIAL COMMENT: ABNORMAL
EOSINOPHIL # BLD MANUAL: 0.37 10*3/MM3 (ref 0–0.7)
EOSINOPHIL NFR BLD MANUAL: 2 % (ref 0.3–6.2)
ERYTHROCYTE [DISTWIDTH] IN BLOOD BY AUTOMATED COUNT: 13.2 % (ref 11.5–14.5)
GLOBULIN SER CALC-MCNC: 2 GM/DL
GLUCOSE SERPL-MCNC: 82 MG/DL (ref 65–99)
HCT VFR BLD AUTO: 39.6 % (ref 40.4–52.2)
HDLC SERPL-MCNC: 47 MG/DL (ref 40–60)
HGB BLD-MCNC: 13.1 G/DL (ref 13.7–17.6)
LDLC SERPL CALC-MCNC: 102 MG/DL (ref 0–100)
LDLC/HDLC SERPL: 2.16 {RATIO}
LYMPHOCYTES # BLD MANUAL: 15.93 10*3/MM3 (ref 0.9–4.8)
LYMPHOCYTES NFR BLD MANUAL: 85 % (ref 19.6–45.3)
MCH RBC QN AUTO: 32.7 PG (ref 27–32.7)
MCHC RBC AUTO-ENTMCNC: 33.1 G/DL (ref 32.6–36.4)
MCV RBC AUTO: 98.8 FL (ref 79.8–96.2)
MONOCYTES # BLD MANUAL: 0.19 10*3/MM3 (ref 0.2–1.2)
MONOCYTES NFR BLD MANUAL: 1 % (ref 5–12)
NEUTROPHILS # BLD MANUAL: 2.25 10*3/MM3 (ref 1.9–8.1)
NEUTROPHILS NFR BLD MANUAL: 12 % (ref 42.7–76)
PLATELET # BLD AUTO: 159 10*3/MM3 (ref 140–500)
PLATELET BLD QL SMEAR: ABNORMAL
POTASSIUM SERPL-SCNC: 4.2 MMOL/L (ref 3.5–5.2)
PROT SERPL-MCNC: 6.5 G/DL (ref 6–8.5)
RBC # BLD AUTO: 4.01 10*6/MM3 (ref 4.6–6)
RBC MORPH BLD: ABNORMAL
SODIUM SERPL-SCNC: 145 MMOL/L (ref 136–145)
TRIGL SERPL-MCNC: 57 MG/DL (ref 0–150)
TSH SERPL DL<=0.005 MIU/L-ACNC: 1.36 MIU/ML (ref 0.27–4.2)
VLDLC SERPL CALC-MCNC: 11.4 MG/DL (ref 5–40)
WBC # BLD AUTO: 18.74 10*3/MM3 (ref 4.5–10.7)

## 2019-03-11 ENCOUNTER — OFFICE VISIT (OUTPATIENT)
Dept: INTERNAL MEDICINE | Facility: CLINIC | Age: 79
End: 2019-03-11

## 2019-03-11 ENCOUNTER — HOSPITAL ENCOUNTER (OUTPATIENT)
Dept: GENERAL RADIOLOGY | Facility: HOSPITAL | Age: 79
Discharge: HOME | End: 2019-03-11
Admitting: NURSE PRACTITIONER

## 2019-03-11 VITALS
OXYGEN SATURATION: 98 % | DIASTOLIC BLOOD PRESSURE: 76 MMHG | HEART RATE: 66 BPM | BODY MASS INDEX: 27.63 KG/M2 | HEIGHT: 72 IN | WEIGHT: 204 LBS | SYSTOLIC BLOOD PRESSURE: 108 MMHG

## 2019-03-11 DIAGNOSIS — M25.552 LEFT HIP PAIN: Primary | ICD-10-CM

## 2019-03-11 DIAGNOSIS — M25.552 LEFT HIP PAIN: ICD-10-CM

## 2019-03-11 PROCEDURE — 99213 OFFICE O/P EST LOW 20 MIN: CPT | Performed by: NURSE PRACTITIONER

## 2019-03-11 PROCEDURE — 73502 X-RAY EXAM HIP UNI 2-3 VIEWS: CPT

## 2019-03-11 RX ORDER — TRAMADOL HYDROCHLORIDE 50 MG/1
50 TABLET ORAL EVERY 12 HOURS PRN
Qty: 30 TABLET | Refills: 0 | Status: SHIPPED | OUTPATIENT
Start: 2019-03-11 | End: 2019-07-11

## 2019-03-13 DIAGNOSIS — M70.72 BURSITIS OF LEFT HIP, UNSPECIFIED BURSA: Primary | ICD-10-CM

## 2019-03-13 DIAGNOSIS — M16.12 ARTHRITIS OF LEFT HIP: ICD-10-CM

## 2019-06-19 DIAGNOSIS — I10 BENIGN ESSENTIAL HYPERTENSION: Primary | ICD-10-CM

## 2019-06-19 DIAGNOSIS — C91.10 CHRONIC LYMPHOCYTIC LEUKEMIA (HCC): ICD-10-CM

## 2019-06-19 DIAGNOSIS — E78.00 HYPERCHOLESTEROLEMIA: ICD-10-CM

## 2019-06-25 LAB
ALBUMIN SERPL-MCNC: 4.6 G/DL (ref 3.5–5.2)
ALBUMIN/GLOB SERPL: 2.4 G/DL
ALP SERPL-CCNC: 77 U/L (ref 39–117)
ALT SERPL-CCNC: 14 U/L (ref 1–41)
AST SERPL-CCNC: 11 U/L (ref 1–40)
BASOPHILS # BLD AUTO: (no result) 10*3/UL
BASOPHILS # BLD MANUAL: 0.3 10*3/MM3 (ref 0–0.2)
BASOPHILS NFR BLD MANUAL: 1.7 % (ref 0–1.5)
BILIRUB SERPL-MCNC: 0.5 MG/DL (ref 0.2–1.2)
BUN SERPL-MCNC: 13 MG/DL (ref 8–23)
BUN/CREAT SERPL: 17.6 (ref 7–25)
CALCIUM SERPL-MCNC: 9.7 MG/DL (ref 8.6–10.5)
CHLORIDE SERPL-SCNC: 106 MMOL/L (ref 98–107)
CHOLEST SERPL-MCNC: 179 MG/DL (ref 0–200)
CO2 SERPL-SCNC: 29.6 MMOL/L (ref 22–29)
CREAT SERPL-MCNC: 0.74 MG/DL (ref 0.76–1.27)
DIFFERENTIAL COMMENT: ABNORMAL
EOSINOPHIL # BLD AUTO: (no result) 10*3/UL
EOSINOPHIL # BLD MANUAL: 0.37 10*3/MM3 (ref 0–0.4)
EOSINOPHIL NFR BLD AUTO: (no result) %
EOSINOPHIL NFR BLD MANUAL: 2.1 % (ref 0.3–6.2)
ERYTHROCYTE [DISTWIDTH] IN BLOOD BY AUTOMATED COUNT: 13.1 % (ref 12.3–15.4)
GLOBULIN SER CALC-MCNC: 1.9 GM/DL
GLUCOSE SERPL-MCNC: 92 MG/DL (ref 65–99)
HCT VFR BLD AUTO: 41.5 % (ref 37.5–51)
HDLC SERPL-MCNC: 45 MG/DL (ref 40–60)
HGB BLD-MCNC: 13.2 G/DL (ref 13–17.7)
LDLC SERPL CALC-MCNC: 111 MG/DL (ref 0–100)
LDLC/HDLC SERPL: 2.48 {RATIO}
LYMPHOCYTES # BLD AUTO: (no result) 10*3/UL
LYMPHOCYTES # BLD MANUAL: 10.18 10*3/MM3 (ref 0.7–3.1)
LYMPHOCYTES NFR BLD AUTO: (no result) %
LYMPHOCYTES NFR BLD MANUAL: 57.3 % (ref 19.6–45.3)
MCH RBC QN AUTO: 32.4 PG (ref 26.6–33)
MCHC RBC AUTO-ENTMCNC: 31.8 G/DL (ref 31.5–35.7)
MCV RBC AUTO: 102 FL (ref 79–97)
MONOCYTES # BLD MANUAL: 0.16 10*3/MM3 (ref 0.1–0.9)
MONOCYTES NFR BLD AUTO: (no result) %
MONOCYTES NFR BLD MANUAL: 0.9 % (ref 5–12)
NEUTROPHILS # BLD MANUAL: 6.61 10*3/MM3 (ref 1.7–7)
NEUTROPHILS NFR BLD AUTO: (no result) %
NEUTROPHILS NFR BLD MANUAL: 37.2 % (ref 42.7–76)
PLATELET # BLD AUTO: 154 10*3/MM3 (ref 140–450)
PLATELET BLD QL SMEAR: ABNORMAL
POTASSIUM SERPL-SCNC: 4.8 MMOL/L (ref 3.5–5.2)
PROT SERPL-MCNC: 6.5 G/DL (ref 6–8.5)
RBC # BLD AUTO: 4.07 10*6/MM3 (ref 4.14–5.8)
RBC MORPH BLD: ABNORMAL
SODIUM SERPL-SCNC: 146 MMOL/L (ref 136–145)
TRIGL SERPL-MCNC: 113 MG/DL (ref 0–150)
TSH SERPL DL<=0.005 MIU/L-ACNC: 2.06 MIU/ML (ref 0.27–4.2)
VLDLC SERPL CALC-MCNC: 22.6 MG/DL
WBC # BLD AUTO: 17.77 10*3/MM3 (ref 3.4–10.8)

## 2019-07-11 ENCOUNTER — OFFICE VISIT (OUTPATIENT)
Dept: INTERNAL MEDICINE | Facility: CLINIC | Age: 79
End: 2019-07-11

## 2019-07-11 VITALS
TEMPERATURE: 97.7 F | HEART RATE: 62 BPM | WEIGHT: 201.5 LBS | DIASTOLIC BLOOD PRESSURE: 74 MMHG | HEIGHT: 72 IN | BODY MASS INDEX: 27.29 KG/M2 | OXYGEN SATURATION: 96 % | SYSTOLIC BLOOD PRESSURE: 122 MMHG

## 2019-07-11 DIAGNOSIS — B35.3 TINEA PEDIS OF BOTH FEET: ICD-10-CM

## 2019-07-11 DIAGNOSIS — Z99.89 OSA ON CPAP: ICD-10-CM

## 2019-07-11 DIAGNOSIS — E78.00 HYPERCHOLESTEROLEMIA: ICD-10-CM

## 2019-07-11 DIAGNOSIS — I10 BENIGN ESSENTIAL HYPERTENSION: ICD-10-CM

## 2019-07-11 DIAGNOSIS — Z00.00 MEDICARE ANNUAL WELLNESS VISIT, SUBSEQUENT: Primary | ICD-10-CM

## 2019-07-11 DIAGNOSIS — G20 PARKINSON DISEASE (HCC): ICD-10-CM

## 2019-07-11 DIAGNOSIS — G47.33 OSA ON CPAP: ICD-10-CM

## 2019-07-11 PROBLEM — R47.1 DYSARTHRIA: Status: ACTIVE | Noted: 2019-05-06
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
## 2019-07-11 PROCEDURE — G0439 PPPS, SUBSEQ VISIT: HCPCS | Performed by: INTERNAL MEDICINE

## 2019-07-11 PROCEDURE — 99214 OFFICE O/P EST MOD 30 MIN: CPT | Performed by: INTERNAL MEDICINE

## 2019-07-11 RX ORDER — NYSTATIN 100000 [USP'U]/G
POWDER TOPICAL 3 TIMES DAILY
Qty: 60 G | Refills: 1 | Status: SHIPPED | OUTPATIENT
Start: 2019-07-11 | End: 2019-12-09

## 2019-07-11 NOTE — PROGRESS NOTES
Subjective   Driss Ackerman is a 79 y.o. male here to follow up on labs.  Pt states he fell from bed 1 week ago and have bruises on legs, left ear pain, balancing issue and itchy foot.    History of Present Illness   HE has been working with speech therapy and PT  He has had a couple of falls  Last week her fells and hit his right ear on his cpap machine and his ear bled a little  No LOCed  Working with PT on his balance  He also reports some vivid dream  He is having some trouble with his cpap  Pt reports an itchiy rash on his feet    The following portions of the patient's history were reviewed and updated as appropriate: allergies, current medications, past medical history, past social history and problem list.  He has been eating ok    Review of Systems   All other systems reviewed and are negative.      Objective   Physical Exam   Constitutional: He is oriented to person, place, and time. He appears well-developed and well-nourished.   HENT:   Head: Normocephalic and atraumatic.   Right Ear: External ear normal.   Left Ear: External ear normal.   Mouth/Throat: Oropharynx is clear and moist.   Eyes: Conjunctivae and EOM are normal. Pupils are equal, round, and reactive to light.   Neck: Normal range of motion. No tracheal deviation present. No thyromegaly present.   Cardiovascular: Normal rate, regular rhythm, normal heart sounds and intact distal pulses.   Pulmonary/Chest: Effort normal and breath sounds normal.   Abdominal: Soft. Bowel sounds are normal. He exhibits no distension. There is no tenderness.   Musculoskeletal: Normal range of motion. He exhibits no edema or deformity.   Neurological: He is alert and oriented to person, place, and time.   Skin: Skin is warm and dry.   Psychiatric: He has a normal mood and affect. His behavior is normal. Judgment and thought content normal.   Vitals reviewed.      Vitals:    07/11/19 1336   BP: 122/74   Pulse: 62   Temp: 97.7 °F (36.5 °C)   SpO2: 96%     Orders  Only on 06/19/2019   Component Date Value Ref Range Status   • Glucose 06/24/2019 92  65 - 99 mg/dL Final   • BUN 06/24/2019 13  8 - 23 mg/dL Final   • Creatinine 06/24/2019 0.74* 0.76 - 1.27 mg/dL Final   • eGFR Non  Am 06/24/2019 102  >60 mL/min/1.73 Final    Comment: The MDRD GFR formula is only valid for adults with stable  renal function between ages 18 and 70.     • eGFR  Am 06/24/2019 124  >60 mL/min/1.73 Final   • BUN/Creatinine Ratio 06/24/2019 17.6  7.0 - 25.0 Final   • Sodium 06/24/2019 146* 136 - 145 mmol/L Final   • Potassium 06/24/2019 4.8  3.5 - 5.2 mmol/L Final   • Chloride 06/24/2019 106  98 - 107 mmol/L Final   • Total CO2 06/24/2019 29.6* 22.0 - 29.0 mmol/L Final   • Calcium 06/24/2019 9.7  8.6 - 10.5 mg/dL Final   • Total Protein 06/24/2019 6.5  6.0 - 8.5 g/dL Final   • Albumin 06/24/2019 4.60  3.50 - 5.20 g/dL Final   • Globulin 06/24/2019 1.9  gm/dL Final   • A/G Ratio 06/24/2019 2.4  g/dL Final   • Total Bilirubin 06/24/2019 0.5  0.2 - 1.2 mg/dL Final   • Alkaline Phosphatase 06/24/2019 77  39 - 117 U/L Final   • AST (SGOT) 06/24/2019 11  1 - 40 U/L Final   • ALT (SGPT) 06/24/2019 14  1 - 41 U/L Final   • Total Cholesterol 06/24/2019 179  0 - 200 mg/dL Final   • Triglycerides 06/24/2019 113  0 - 150 mg/dL Final   • HDL Cholesterol 06/24/2019 45  40 - 60 mg/dL Final   • VLDL Cholesterol 06/24/2019 22.6  mg/dL Final   • LDL Cholesterol  06/24/2019 111* 0 - 100 mg/dL Final   • LDL/HDL Ratio 06/24/2019 2.48   Final   • TSH 06/24/2019 2.060  0.270 - 4.200 mIU/mL Final   • WBC 06/24/2019 17.77* 3.40 - 10.80 10*3/mm3 Final   • RBC 06/24/2019 4.07* 4.14 - 5.80 10*6/mm3 Final   • Hemoglobin 06/24/2019 13.2  13.0 - 17.7 g/dL Final   • Hematocrit 06/24/2019 41.5  37.5 - 51.0 % Final   • MCV 06/24/2019 102.0* 79.0 - 97.0 fL Final   • MCH 06/24/2019 32.4  26.6 - 33.0 pg Final   • MCHC 06/24/2019 31.8  31.5 - 35.7 g/dL Final   • RDW 06/24/2019 13.1  12.3 - 15.4 % Final   • Platelets 06/24/2019  154  140 - 450 10*3/mm3 Final   • Neutrophil Rel % 06/24/2019 CANCELED   Final-Edited    Comment: Test not performed    Result canceled by the ancillary.     • Lymphocyte Rel % 06/24/2019 CANCELED   Final-Edited    Comment: Test not performed    Result canceled by the ancillary.     • Monocyte Rel % 06/24/2019 CANCELED   Final-Edited    Comment: Test not performed    Result canceled by the ancillary.     • Eosinophil Rel % 06/24/2019 CANCELED   Final-Edited    Comment: Test not performed    Result canceled by the ancillary.     • Lymphocytes Absolute 06/24/2019 CANCELED   Final-Edited    Comment: Test not performed    Result canceled by the ancillary.     • Eosinophils Absolute 06/24/2019 CANCELED   Final-Edited    Comment: Test not performed    Result canceled by the ancillary.     • Basophils Absolute 06/24/2019 CANCELED   Final-Edited    Comment: Test not performed    Result canceled by the ancillary.     • Neutrophil Rel % 06/24/2019 37.2* 42.7 - 76.0 % Final   • Lymphocyte Rel % 06/24/2019 57.3* 19.6 - 45.3 % Final   • Monocyte Rel % 06/24/2019 0.9* 5.0 - 12.0 % Final   • Eosinophil Rel % 06/24/2019 2.1  0.3 - 6.2 % Final   • Basophil Rel % 06/24/2019 1.7* 0.0 - 1.5 % Final   • Neutrophils Absolute 06/24/2019 6.61  1.70 - 7.00 10*3/mm3 Final   • Lymphocytes Absolute 06/24/2019 10.18* 0.70 - 3.10 10*3/mm3 Final   • Monocytes Absolute 06/24/2019 0.16  0.10 - 0.90 10*3/mm3 Final   • Eosinophil Abs 06/24/2019 0.37  0.00 - 0.40 10*3/mm3 Final   • Basophils Absolute 06/24/2019 0.30* 0.00 - 0.20 10*3/mm3 Final   • Differential Comment 06/24/2019 Comment   Final    Comment: METAMYELOCYTE %              0.4              %          0.0-0.0    H  ATYPICAL LYMPHOCYTE %        0.4              %          0.0-5.0    N  SMUDGE CELLS                 Large/3+                    None Seen  N     • Comment 06/24/2019 Comment   Final    RBC MORPHOLOGY               Normal                      Normal     N   • Plt Comment  06/24/2019 Comment   Final    PLATELET MORPHOLOGY          Normal                      Normal     N     Current Outpatient Medications:   •  acetaminophen (TYLENOL) 500 MG tablet, Take 500 mg by mouth Daily. Take 3.5 TABLETS BY MOUTH DAILY PRN, Disp: , Rfl:   •  aspirin 81 MG EC tablet, Take 81 mg by mouth Daily., Disp: , Rfl:   •  carbidopa-levodopa (SINEMET)  MG per tablet, Take 3 tablets by mouth., Disp: , Rfl:   •  citalopram (CeleXA) 10 MG tablet, , Disp: , Rfl:   •  Coenzyme Q10 (CO Q 10) 100 MG capsule, Take 1 tablet by mouth Daily., Disp: , Rfl:   •  Cyanocobalamin (VITAMIN B-12 ER) 2000 MCG tablet controlled-release, Place 1 tablet under the tongue Daily., Disp: , Rfl:   •  fluorouracil (EFUDEX) 5 % cream, , Disp: , Rfl:   •  fluticasone (FLONASE) 50 MCG/ACT nasal spray, 2 sprays into each nostril Daily., Disp: 3 bottle, Rfl: 1  •  mesalamine (LIALDA) 1.2 G EC tablet, Take 3 tablets by mouth Daily. (Patient taking differently: Take 3.6 g by mouth Daily. Pt takes 4 tabs a day), Disp: 270 tablet, Rfl: 1  •  metoprolol succinate XL (TOPROL XL) 25 MG 24 hr tablet, Take 1 tablet by mouth Daily. (Patient taking differently: Take 0.5 mg by mouth Daily.), Disp: 90 tablet, Rfl: 1  •  Multiple Vitamins-Minerals (MULTIVITAMIN PO), Take 1 tablet by mouth daily., Disp: , Rfl:   •  Omega-3 Fatty Acids (FISH OIL) 1000 MG capsule capsule, Take  by mouth daily with breakfast., Disp: , Rfl:   •  nystatin (MYCOSTATIN) 705998 UNIT/GM powder, Apply  topically to the appropriate area as directed 3 (Three) Times a Day., Disp: 60 g, Rfl: 1         Assessment/Plan   Diagnoses and all orders for this visit:    PAVEL on CPAP    Parkinson disease (CMS/HCC)    Tinea pedis of both feet    Hypercholesterolemia    Benign essential hypertension    Other orders  -     nystatin (MYCOSTATIN) 517858 UNIT/GM powder; Apply  topically to the appropriate area as directed 3 (Three) Times a Day.      1.  PAVEL-  On CPAP-  Seeing sleep  2.   Parkinsons- falls are a big problem  He needs cont to work with PT  3.  HPL- off statin as he did not mo  And he is doing well  4.  Tinea pedia-  Trynystatin  5.  HTN- ok with current meds

## 2019-07-11 NOTE — PATIENT INSTRUCTIONS
Medicare Wellness  Personal Prevention Plan of Service     Date of Office Visit:  2019  Encounter Provider:  Sanjuana De La Cruz MD  Place of Service:  [facility]  Patient Name: Driss Ackerman  :  1940    As part of the Medicare Wellness portion of your visit today, we are providing you with this personalized preventive plan of services (PPPS). This plan is based upon recommendations of the [place] Preventive Services Task Force (USPSTF) and the Advisory Committee on Immunization Practices (ACIP).    This lists the preventive care services that should be considered, and provides dates of when you are due. Items listed as completed are up-to-date and do not require any further intervention.    Health Maintenance   Topic Date Due   • TDAP/TD VACCINES (1 - Tdap) 1959   • ZOSTER VACCINE (2 of 3) 2011   • MEDICARE ANNUAL WELLNESS  2019   • INFLUENZA VACCINE  2019   • LIPID PANEL  2020   • PNEUMOCOCCAL VACCINES (65+ LOW/MEDIUM RISK)  Completed       No orders of the defined types were placed in this encounter.      Return in about 6 months (around 2020).          Fall Prevention in the Home, Adult  Falls can cause injuries. They can happen to people of all ages. There are many things you can do to make your home safe and to help prevent falls. Ask for help when making these changes, if needed.  What actions can I take to prevent falls?  General Instructions  · Use good lighting in all rooms. Replace any light bulbs that burn out.  · Turn on the lights when you go into a dark area. Use night-lights.  · Keep items that you use often in easy-to-reach places. Lower the shelves around your home if necessary.  · Set up your furniture so you have a clear path. Avoid moving your furniture around.  · Do not have throw rugs and other things on the floor that can make you trip.  · Avoid walking on wet floors.  · If any of your floors are uneven,  fix them.  · Add color or contrast paint or tape to clearly will and help you see:  ? Any grab bars or handrails.  ? First and last steps of stairways.  ? Where the edge of each step is.  · If you use a stepladder:  ? Make sure that it is fully opened. Do not climb a closed stepladder.  ? Make sure that both sides of the stepladder are locked into place.  ? Ask someone to hold the stepladder for you while you use it.  · If there are any pets around you, be aware of where they are.  What can I do in the bathroom?  · Keep the floor dry. Clean up any water that spills onto the floor as soon as it happens.  · Remove soap buildup in the tub or shower regularly.  · Use non-skid mats or decals on the floor of the tub or shower.  · Attach bath mats securely with double-sided, non-slip rug tape.  · If you need to sit down in the shower, use a plastic, non-slip stool.  · Install grab bars by the toilet and in the tub and shower. Do not use towel bars as grab bars.  What can I do in the bedroom?  · Make sure that you have a light by your bed that is easy to reach.  · Do not use any sheets or blankets that are too big for your bed. They should not hang down onto the floor.  · Have a firm chair that has side arms. You can use this for support while you get dressed.  What can I do in the kitchen?  · Clean up any spills right away.  · If you need to reach something above you, use a strong step stool that has a grab bar.  · Keep electrical cords out of the way.  · Do not use floor polish or wax that makes floors slippery. If you must use wax, use non-skid floor wax.  What can I do with my stairs?  · Do not leave any items on the stairs.  · Make sure that you have a light switch at the top of the stairs and the bottom of the stairs. If you do not have them, ask someone to add them for you.  · Make sure that there are handrails on both sides of the stairs, and use them. Fix handrails that are broken or loose. Make sure that handrails  are as long as the stairways.  · Install non-slip stair treads on all stairs in your home.  · Avoid having throw rugs at the top or bottom of the stairs. If you do have throw rugs, attach them to the floor with carpet tape.  · Choose a carpet that does not hide the edge of the steps on the stairway.  · Check any carpeting to make sure that it is firmly attached to the stairs. Fix any carpet that is loose or worn.  What can I do on the outside of my home?  · Use bright outdoor lighting.  · Regularly fix the edges of walkways and driveways and fix any cracks.  · Remove anything that might make you trip as you walk through a door, such as a raised step or threshold.  · Trim any bushes or trees on the path to your home.  · Regularly check to see if handrails are loose or broken. Make sure that both sides of any steps have handrails.  · Install guardrails along the edges of any raised decks and porches.  · Clear walking paths of anything that might make someone trip, such as tools or rocks.  · Have any leaves, snow, or ice cleared regularly.  · Use sand or salt on walking paths during winter.  · Clean up any spills in your garage right away. This includes grease or oil spills.  What other actions can I take?  · Wear shoes that:  ? Have a low heel. Do not wear high heels.  ? Have rubber bottoms.  ? Are comfortable and fit you well.  ? Are closed at the toe. Do not wear open-toe sandals.  · Use tools that help you move around (mobility aids) if they are needed. These include:  ? Canes.  ? Walkers.  ? Scooters.  ? Crutches.  · Review your medicines with your doctor. Some medicines can make you feel dizzy. This can increase your chance of falling.  Ask your doctor what other things you can do to help prevent falls.  Where to find more information  · Centers for Disease Control and Prevention, [place]: https://cdc.gov  · [employer]: https://pr2kvpp.filemon.nih.gov  Contact a doctor if:  · You are afraid of falling  at home.  · You feel weak, drowsy, or dizzy at home.  · You fall at home.  Summary  · There are many simple things that you can do to make your home safe and to help prevent falls.  · Ways to make your home safe include removing tripping hazards and installing grab bars in the bathroom.  · Ask for help when making these changes in your home.  This information is not intended to replace advice given to you by your health care provider. Make sure you discuss any questions you have with your health care provider.  Document Released: 10/14/2010 Document Revised: 08/02/2018 Document Reviewed: 08/02/2018  [employer] Patient Education © 2019 [employer]

## 2019-07-11 NOTE — PROGRESS NOTES
QUICK REFERENCE INFORMATION:  The ABCs of the Annual Wellness Visit    Subsequent Medicare Wellness Visit     HEALTH RISK ASSESSMENT    : 1940    Recent Hospitalizations:  No hospitalization(s) within the last year..  ccc      Current Medical Providers:  Patient Care Team:  Sanjuana De La Cruz MD as PCP - General  Sanjuana DeL a Cruz MD as Referring Physician (Internal Medicine)  Alvin Jackman MD as Consulting Physician (Hematology and Oncology)        Smoking Status:  Social History     Tobacco Use   Smoking Status Never Smoker   Smokeless Tobacco Never Used       Alcohol Consumption:  Social History     Substance and Sexual Activity   Alcohol Use No       Depression Screen:   PHQ-2/PHQ-9 Depression Screening 2019   Little interest or pleasure in doing things 0   Feeling down, depressed, or hopeless 1   Trouble falling or staying asleep, or sleeping too much 0   Feeling tired or having little energy 1   Poor appetite or overeating 1   Feeling bad about yourself - or that you are a failure or have let yourself or your family down 0   Trouble concentrating on things, such as reading the newspaper or watching television 0   Moving or speaking so slowly that other people could have noticed. Or the opposite - being so fidgety or restless that you have been moving around a lot more than usual 0   Thoughts that you would be better off dead, or of hurting yourself in some way 0   Total Score 3   If you checked off any problems, how difficult have these problems made it for you to do your work, take care of things at home, or get along with other people? Not difficult at all       Health Habits and Functional and Cognitive Screening:  Functional & Cognitive Status 2019   Do you have difficulty preparing food and eating? No   Do you have difficulty bathing yourself, getting dressed or grooming yourself? Yes   Do you have difficulty using the toilet? No   Do you have difficulty moving around from place to place? Yes    Do you have trouble with steps or getting out of a bed or a chair? Yes   In the past year have you fallen or experienced a near fall? Yes   Current Diet Well Balanced Diet   Dental Exam Up to date   Eye Exam Up to date   Exercise (times per week) 5 times per week   Current Exercise Activities Include Cardiovasular Workout on Exercise Equipment   Do you need help using the phone?  No   Are you deaf or do you have serious difficulty hearing?  Yes   Do you need help with transportation? No   Do you need help shopping? No   Do you need help preparing meals?  No   Do you need help with housework?  No   Do you need help with laundry? No   Do you need help taking your medications? No   Do you need help managing money? No   Do you ever drive or ride in a car without wearing a seat belt? No   Have you felt unusual stress, anger or loneliness in the last month? No   Who do you live with? Spouse   If you need help, do you have trouble finding someone available to you? No   Have you been bothered in the last four weeks by sexual problems? No   Do you have difficulty concentrating, remembering or making decisions? Yes           Does the patient have evidence of cognitive impairment? Yes    Asiprin use counseling: Taking ASA appropriately as indicated      Recent Lab Results:    Lab Results   Component Value Date    GLU 92 06/24/2019        Lab Results   Component Value Date    TRIG 113 06/24/2019    HDL 45 06/24/2019    VLDL 22.6 06/24/2019    LDLHDL 2.48 06/24/2019           Age-appropriate Screening Schedule:  Refer to the list below for future screening recommendations based on patient's age, sex and/or medical conditions. Orders for these recommended tests are listed in the plan section. The patient has been provided with a written plan.    Health Maintenance   Topic Date Due   • TDAP/TD VACCINES (1 - Tdap) [birth year]/1959   • ZOSTER VACCINE (2 of 3) 05/06/2011   • INFLUENZA VACCINE  08/01/2019   • LIPID PANEL  06/24/2020   •  PNEUMOCOCCAL VACCINES (65+ LOW/MEDIUM RISK)  Completed        Subjective   History of Present Illness    Driss Ackerman is a 79 y.o. male who presents for an Annual Wellness Visit.    The following portions of the patient's history were reviewed and updated as appropriate: allergies, current medications, past family history, past medical history, past social history, past surgical history and problem list.    Outpatient Medications Prior to Visit   Medication Sig Dispense Refill   • acetaminophen (TYLENOL) 500 MG tablet Take 500 mg by mouth Daily. Take 3.5 TABLETS BY MOUTH DAILY PRN     • aspirin 81 MG EC tablet Take 81 mg by mouth Daily.     • carbidopa-levodopa (SINEMET)  MG per tablet Take 3 tablets by mouth.     • citalopram (CeleXA) 10 MG tablet      • Coenzyme Q10 (CO Q 10) 100 MG capsule Take 1 tablet by mouth Daily.     • Cyanocobalamin (VITAMIN B-12 ER) 2000 MCG tablet controlled-release Place 1 tablet under the tongue Daily.     • fluorouracil (EFUDEX) 5 % cream      • fluticasone (FLONASE) 50 MCG/ACT nasal spray 2 sprays into each nostril Daily. 3 bottle 1   • mesalamine (LIALDA) 1.2 G EC tablet Take 3 tablets by mouth Daily. (Patient taking differently: Take 3.6 g by mouth Daily. Pt takes 4 tabs a day) 270 tablet 1   • metoprolol succinate XL (TOPROL XL) 25 MG 24 hr tablet Take 1 tablet by mouth Daily. (Patient taking differently: Take 0.5 mg by mouth Daily.) 90 tablet 1   • Multiple Vitamins-Minerals (MULTIVITAMIN PO) Take 1 tablet by mouth daily.     • Omega-3 Fatty Acids (FISH OIL) 1000 MG capsule capsule Take  by mouth daily with breakfast.     • Docusate Calcium (STOOL SOFTENER PO) Take  by mouth Daily.     • atorvastatin (LIPITOR) 10 MG tablet TAKE 1 TABLET DAILY (Patient taking differently: TAKE 0.5MG ONCE DAILY) 90 tablet 1   • traMADol (ULTRAM) 50 MG tablet Take 1 tablet by mouth Every 12 (Twelve) Hours As Needed for Moderate Pain . 30 tablet 0     No facility-administered medications  "prior to visit.        Patient Active Problem List   Diagnosis   • Chronic coronary artery disease   • Arthritis   • Atrial fibrillation (CMS/HCC)   • Benign prostatic hyperplasia with urinary obstruction   • Fatigue   • Hypercholesterolemia   • Hypogonadism in male   • Memory impairment   • Ulcerative colitis (CMS/HCC)   • Vitamin D deficiency   • Atopic rhinitis   • Benign essential hypertension   • Chronic lymphocytic leukemia (CMS/HCC)   • Thrombocytopenia (CMS/HCC)   • Ulcerative proctitis with complication (CMS/HCC)   • Parkinson disease (CMS/HCC)   • PAVEL on CPAP   • Apathy   • History of colonic polyps   • Sialorrhea   • Dysarthria   • Fall       Advance Care Planning:  Patient has an advance directive - a copy has been provided and is visible in patient header    Identification of Risk Factors:  Risk factors include: Fall Risk  Immunizations Discussed/Encouraged (specific immunizations; Shingrix ).    Review of Systems    Compared to one year ago, the patient feels his physical health is worse.parkinsons a little worse  Compared to one year ago, the patient feels his mental health is worse.  Memory a little     Objective     Physical Exam    Vitals:    07/11/19 1336   BP: 122/74   BP Location: Left arm   Patient Position: Sitting   Cuff Size: Adult   Pulse: 62   Temp: 97.7 °F (36.5 °C)   TempSrc: Oral   SpO2: 96%   Weight: 91.4 kg (201 lb 8 oz)   Height: 182.9 cm (72.01\")   PainSc:   2       Patient's Body mass index is 27.32 kg/m². BMI is within normal parameters. No follow-up required..      Assessment/Plan   Patient Self-Management and Personalized Health Advice  The patient has been provided with information about: diet, exercise, the relationship between weight and GERD and fall prevention and preventive services including:   · Exercise counseling provided, Nutrition counseling provided.    Visit Diagnoses:    ICD-10-CM ICD-9-CM   1. Medicare annual wellness visit, subsequent Z00.00 V70.0   2. PAVEL on " CPAP G47.33 327.23    Z99.89 V46.8   3. Parkinson disease (CMS/[facility]) G20 332.0   4. Tinea pedis of both feet B35.3 110.4   5. Hypercholesterolemia E78.00 272.0   6. Benign essential hypertension I10 401.1       No orders of the defined types were placed in this encounter.      Outpatient Encounter Medications as of 7/11/2019   Medication Sig Dispense Refill   • acetaminophen (TYLENOL) 500 MG tablet Take 500 mg by mouth Daily. Take 3.5 TABLETS BY MOUTH DAILY PRN     • aspirin 81 MG EC tablet Take 81 mg by mouth Daily.     • carbidopa-levodopa (SINEMET)  MG per tablet Take 3 tablets by mouth.     • citalopram (CeleXA) 10 MG tablet      • Coenzyme Q10 (CO Q 10) 100 MG capsule Take 1 tablet by mouth Daily.     • Cyanocobalamin (VITAMIN B-12 ER) 2000 MCG tablet controlled-release Place 1 tablet under the tongue Daily.     • fluorouracil (EFUDEX) 5 % cream      • fluticasone (FLONASE) 50 MCG/ACT nasal spray 2 sprays into each nostril Daily. 3 bottle 1   • mesalamine (LIALDA) 1.2 G EC tablet Take 3 tablets by mouth Daily. (Patient taking differently: Take 3.6 g by mouth Daily. Pt takes 4 tabs a day) 270 tablet 1   • metoprolol succinate XL (TOPROL XL) 25 MG 24 hr tablet Take 1 tablet by mouth Daily. (Patient taking differently: Take 0.5 mg by mouth Daily.) 90 tablet 1   • Multiple Vitamins-Minerals (MULTIVITAMIN PO) Take 1 tablet by mouth daily.     • Omega-3 Fatty Acids (FISH OIL) 1000 MG capsule capsule Take  by mouth daily with breakfast.     • [DISCONTINUED] Docusate Calcium (STOOL SOFTENER PO) Take  by mouth Daily.     • nystatin (MYCOSTATIN) 369892 UNIT/GM powder Apply  topically to the appropriate area as directed 3 (Three) Times a Day. 60 g 1   • [DISCONTINUED] atorvastatin (LIPITOR) 10 MG tablet TAKE 1 TABLET DAILY (Patient taking differently: TAKE 0.5MG ONCE DAILY) 90 tablet 1   • [DISCONTINUED] traMADol (ULTRAM) 50 MG tablet Take 1 tablet by mouth Every 12 (Twelve) Hours As Needed for Moderate Pain . 30  tablet 0     No facility-administered encounter medications on file as of 7/11/2019.        Reviewed use of high risk medication in the elderly: yes  Reviewed for potential of harmful drug interactions in the elderly: yes    Follow Up:  Return in about 6 months (around 1/11/2020).     An After Visit Summary and PPPS with all of these plans were given to the patient.    He is doing very well with PT  Cont to wokr on nakia chi and boxing  To help his balance

## 2019-07-16 DIAGNOSIS — C91.10 CHRONIC LYMPHOCYTIC LEUKEMIA (HCC): Primary | ICD-10-CM

## 2019-08-08 ENCOUNTER — OFFICE VISIT (OUTPATIENT)
Dept: ONCOLOGY | Facility: CLINIC | Age: 79
End: 2019-08-08

## 2019-08-08 ENCOUNTER — LAB (OUTPATIENT)
Dept: LAB | Facility: HOSPITAL | Age: 79
End: 2019-08-08

## 2019-08-08 VITALS
SYSTOLIC BLOOD PRESSURE: 130 MMHG | DIASTOLIC BLOOD PRESSURE: 74 MMHG | HEART RATE: 66 BPM | HEIGHT: 71 IN | WEIGHT: 199.8 LBS | OXYGEN SATURATION: 95 % | TEMPERATURE: 98.3 F | BODY MASS INDEX: 27.97 KG/M2 | RESPIRATION RATE: 16 BRPM

## 2019-08-08 DIAGNOSIS — B37.2 MONILIAL INTERTRIGO: ICD-10-CM

## 2019-08-08 DIAGNOSIS — C91.10 CHRONIC LYMPHOCYTIC LEUKEMIA (HCC): ICD-10-CM

## 2019-08-08 DIAGNOSIS — C91.10 CHRONIC LYMPHOCYTIC LEUKEMIA (HCC): Primary | ICD-10-CM

## 2019-08-08 LAB
ALBUMIN SERPL-MCNC: 4.5 G/DL (ref 3.5–5.2)
ALBUMIN/GLOB SERPL: 2.4 G/DL (ref 1.1–2.4)
ALP SERPL-CCNC: 64 U/L (ref 38–116)
ALT SERPL W P-5'-P-CCNC: <5 U/L (ref 0–41)
ANION GAP SERPL CALCULATED.3IONS-SCNC: 10.2 MMOL/L (ref 5–15)
AST SERPL-CCNC: 12 U/L (ref 0–40)
BASOPHILS # BLD AUTO: 0.05 10*3/MM3 (ref 0–0.2)
BASOPHILS NFR BLD AUTO: 0.3 % (ref 0–1.5)
BILIRUB SERPL-MCNC: 0.4 MG/DL (ref 0.2–1.2)
BUN BLD-MCNC: 13 MG/DL (ref 6–20)
BUN/CREAT SERPL: 21.7 (ref 7.3–30)
CALCIUM SPEC-SCNC: 9.3 MG/DL (ref 8.5–10.2)
CHLORIDE SERPL-SCNC: 105 MMOL/L (ref 98–107)
CO2 SERPL-SCNC: 27.8 MMOL/L (ref 22–29)
CREAT BLD-MCNC: 0.6 MG/DL (ref 0.7–1.3)
DEPRECATED RDW RBC AUTO: 50.3 FL (ref 37–54)
EOSINOPHIL # BLD AUTO: 0.14 10*3/MM3 (ref 0–0.4)
EOSINOPHIL NFR BLD AUTO: 0.9 % (ref 0.3–6.2)
ERYTHROCYTE [DISTWIDTH] IN BLOOD BY AUTOMATED COUNT: 13.4 % (ref 12.3–15.4)
GFR SERPL CREATININE-BSD FRML MDRD: 130 ML/MIN/1.73
GLOBULIN UR ELPH-MCNC: 1.9 GM/DL (ref 1.8–3.5)
GLUCOSE BLD-MCNC: 110 MG/DL (ref 74–124)
HCT VFR BLD AUTO: 38.3 % (ref 37.5–51)
HGB BLD-MCNC: 12.3 G/DL (ref 13–17.7)
IMM GRANULOCYTES # BLD AUTO: 0.03 10*3/MM3 (ref 0–0.05)
IMM GRANULOCYTES NFR BLD AUTO: 0.2 % (ref 0–0.5)
LYMPHOCYTES # BLD AUTO: 12.5 10*3/MM3 (ref 0.7–3.1)
LYMPHOCYTES NFR BLD AUTO: 77.5 % (ref 19.6–45.3)
MCH RBC QN AUTO: 32.8 PG (ref 26.6–33)
MCHC RBC AUTO-ENTMCNC: 32.1 G/DL (ref 31.5–35.7)
MCV RBC AUTO: 102.1 FL (ref 79–97)
MONOCYTES # BLD AUTO: 0.39 10*3/MM3 (ref 0.1–0.9)
MONOCYTES NFR BLD AUTO: 2.4 % (ref 5–12)
NEUTROPHILS # BLD AUTO: 3.01 10*3/MM3 (ref 1.7–7)
NEUTROPHILS NFR BLD AUTO: 18.7 % (ref 42.7–76)
NRBC BLD AUTO-RTO: 0 /100 WBC (ref 0–0.2)
PLATELET # BLD AUTO: 128 10*3/MM3 (ref 140–450)
PMV BLD AUTO: 8.8 FL (ref 6–12)
POTASSIUM BLD-SCNC: 4.4 MMOL/L (ref 3.5–4.7)
PROT SERPL-MCNC: 6.4 G/DL (ref 6.3–8)
RBC # BLD AUTO: 3.75 10*6/MM3 (ref 4.14–5.8)
SODIUM BLD-SCNC: 143 MMOL/L (ref 134–145)
WBC NRBC COR # BLD: 16.12 10*3/MM3 (ref 3.4–10.8)

## 2019-08-08 PROCEDURE — 99214 OFFICE O/P EST MOD 30 MIN: CPT | Performed by: INTERNAL MEDICINE

## 2019-08-08 PROCEDURE — 36415 COLL VENOUS BLD VENIPUNCTURE: CPT | Performed by: INTERNAL MEDICINE

## 2019-08-08 PROCEDURE — 80053 COMPREHEN METABOLIC PANEL: CPT | Performed by: INTERNAL MEDICINE

## 2019-08-08 PROCEDURE — 85025 COMPLETE CBC W/AUTO DIFF WBC: CPT | Performed by: INTERNAL MEDICINE

## 2019-08-08 RX ORDER — FLUCONAZOLE 150 MG/1
150 TABLET ORAL WEEKLY
Qty: 4 TABLET | Refills: 0 | Status: SHIPPED | OUTPATIENT
Start: 2019-08-08 | End: 2019-12-09

## 2019-08-08 RX ORDER — KETOCONAZOLE 20 MG/G
CREAM TOPICAL EVERY 12 HOURS SCHEDULED
Qty: 60 G | Refills: 3 | Status: SHIPPED | OUTPATIENT
Start: 2019-08-08 | End: 2020-07-06

## 2019-08-08 NOTE — PROGRESS NOTES
REASON FOR FOLLOWUP: CHRONIC LYMPHOCYTIC LEUKEMIA. PATEL STAGE 0     HISTORY OF PRESENT ILLNESS:  This patient returns today to the office for followup by himself. He has dropped himself into the office given the fact that his wife has been sick with a recent fall and she is not able to get around too well. In any event the patient feels relatively well. The most concerning issue is his Parkinson's but he continues doing physical therapy at least 3 times a week for this. He has had stability of the clinical picture of his Parkinson's. His tremor is minimal. He has not had any falls. His ability to speak, memory and upper neurological functions are conserved. The patient has minimal difficulty with his walking. He has not had any fever or infections, no chills, no sweats, no adenopathies, no rashes. His heart has not had any problems neither his lungs. His appetite is good, his weight is stable. Bowel activity with occasional loose bowel movement, no passage of blood in the stool from his inflammatory bowel disease. Urination with frequency. No hematuria. No infections. He was depressed for a while, he is taking antidepressant medication and this is making a big difference in regard how he feels.     He has not had any other symptoms pertinent to his CLL.     He wants for me to review a rash that he has in his buttocks and perineal area.                  Medical History              Past Medical History   Diagnosis Date   • Actinic keratosis     • Allergic rhinitis     • Anemia     • Arteriosclerosis of carotid artery     • Arthritis     • Atrial fibrillation     • CAD (coronary artery disease)     • Constipation     • Eczema     • Fatigue     • Hearing loss     • HTN (hypertension)     • Hypercholesteremia     • Hypogonadism male     • Leukocytosis     • Memory change     • Myalgia     • Osteoarthritis         ANKLE, FOOT, RIGHT   • Prostatitis     • Ulcerative colitis               Surgical History           Past  Surgical History   Procedure Laterality Date   • Coronary artery bypass graft       • Total knee arthroplasty           left 4/2015, right 11/2015        Right ankle arthrodesis with large bone graft and realignment. Right peroneus brevis tendon repair on 4/25/16 by .      HEMATOLOGY/ONCOLOGY HISTORY:   Patient was initially seen on 3/24/2016 for evaluation leukocytosis. The patient previously was seen by Dr. Landon Phillips and since Dr. Phillips left town recently, he has been seeing Dr. De La Cruz. This patient was seen by orthopedic surgeon, Dr. Gonzalez, for planned right ankle surgical procedure. However, because of elevated WBC patient is referred to us for evaluation and his surgery has been on hold.      Patient reports no fever, sweating, chills and especially no night sweats. He has no significant weight losses (he did have some weight loss after the surgery but has regained). The patient otherwise has no B symptoms or other specific complaints.      According to the patient, he was told having elevated WBC counts in October of 2015 when he had right knee replacement. Per computer records, the last time he had normal WBC was back in July of 2013 when he had laboratory study at Dr. Huntley’s office on 07/16/2013. The study reported a total WBC of 5200 including neutrophils 2600 and lymphocytes 2300. His hemoglobin was 14.7 and platelets 173,000. MCV was 102. His chemistry lab reported normal creatinine of 0.71, normal electrolytes and normal liver function panel. His TSH was also normal at 1.28 and vitamin D at 34.8 ng/mL. There were no CBC results available for review until 10/06/2015 when he had total WBC of 12,800 including neutrophils 2900 and lymphocytes 9600. His hemoglobin was 12.4, MCV 91, platelets 171,000. At that time patient had right knee replacement. Chemistry lab showed normal TSH of 1.8 and vitamin D at 29.6 ng/mL.     On April 16 of 2015, laboratory study on the day of surgery reported a total  WBC of 13,300 including neutrophils 2400, lymphocytes 10,400, hemoglobin 13.5 and platelets 149,000. MCV was 95. Postop hemoglobin was 10.5 on 2015.      On 3/24/16, the day of his initial oncology evaluation, his total WBC was 10,960, ANC 3000 and lymphocyte 7,210, Platelet 147,000 and hemoglobin 12.3 g/dl. , normal CMP. Serum iron 150, TIBC 321, iron saturation 47, ferritin 60.5 ng/ml, folate 16.6 ng/ml, B12 at 625 pg/ml.      Peripheral blood Flowcytometry study on 3/24/16 reported chronic lymphocytic leukemia. Negative for CD38 and ZAP70 by flow. The CLL panel reported positive for 55.5% cells deletion of chrom 13q14 DLEU1/DLEU2, negative for all the others.      CT scan on 3/29/2016 reported the neck and chest are largely unremarkable. No mass or adenopathy is present at these levels. The spleen is enlarged 16 cm at the craniocaudal extent. There are some mildly prominent lymph nodes in the bilateral inguinal regions with the largest node at the right inguinal level measuring up to 2.7 cm long axis.  . No further adenopathy is identified within the abdomen or pelvis.      SOCIAL HISTORY:  reports that he has never smoked. He does not have any smokeless tobacco history on file. He reports that he does not drink alcohol. His drug history is not on file. Patient is a retired from [employer], worked with the administration office.     FAMILY HISTORY:  family history includes Heart disease in his father and mother. Mother was diagnosed of a chronic leukemia, at about age of 55, she passed away at age of 90 to have received the chemotherapy for her malignancy. Father  at age of 85 and because of heart disease. Patient has no siblings. Patient has no inflammation about the grandparents on both sides.      ALLERGIES:          Allergies   Allergen Reactions   • Penicillins Rash       MEDICATIONS: reviewed and documented on chart, see EMR.        Review of Systems        General: no  fever, no chills, no fatigue,no weight changes, no lack of appetite.  Eyes: no epiphora, xerophthalmia,conjunctivitis, pain, glaucoma, blurred vision, blindness, secretion, photophobia, proptosis, diplopia.  Ears: no otorrhea, tinnitus, otorrhagia, deafness, pain, vertigo.  Nose: no rhinorrhea, no epistaxis, no alteration in perception of odors, no sinuses pressure.  Mouth: no alteration in gums or teeth,  No ulcers, no difficulty with mastication or deglut ion, no odynophagia.  Neck: no masses or pain, no thyroid alterations, no pain in muscles or arteries, no carotid odynia, no crepitation.  Respiratory: no cough, no sputum production,no dyspnea,no trepopnea, no pleuritic pain,no hemoptysis.  Heart: no syncope, no irregularity, no palpitations, no angina,no orthopnea,no paroxysmal nocturnal dyspnea.  Vascular Venous: no tenderness,no edema,no palpable cords,no postphlebitic syndrome, no skin changes no ulcerations.  Vascular Arterial: no distal ischemia, noclaudication, no gangrene, no neuropathic ischemic pain, no skin ulcers, no paleness no cyanosis.  GI: no dysphagia, no odynophagia, no regurgitation, no heartburn,no indigestion,no nausea,no vomiting,no hematemesis ,no melena,no jaundice,no distention, no obstipation,no enterorrhagia,no proctalgia,no anal  lesions, no changes in bowel habits.  : no frequency, no hesitancy, no hematuria, no discharge,no  pain.  Musculoskeletal: no muscle or tendon pain or inflammation,no  joint pain, no edema, no functional limitation,no fasciculations, no mass.  Neurologic: no headache, no seizures, noalterations on Craneal nerves, no motor deficit, no sensory deficit, good coordination, no alteration in memory,normal orientation, calculation,normal writting, verbal and written language.minimal tremor  Skin: no rashes,no pruritus no localized lesions.  Psychiatric: no anxiety, no depression,no agitation, no delusions, proper insight.    Vitals:    08/08/19 1412   BP: 130/74  "  Pulse: 66   Resp: 16   Temp: 98.3 °F (36.8 °C)   TempSrc: Oral   SpO2: 95%   Weight: 90.6 kg (199 lb 12.8 oz)   Height: 180 cm (70.87\")   PainSc: 0-No pain     Physical Exam   GENERAL:  Well-developed, well-nourished  Patient  in no acute distress.   SKIN:  Warm, dry ,NO rashes,NO purpura ,NO petechiae.extensive perineal candida intertrigo  HEENT:  Pupils were equal and reactive to light and accomodation, conjunctivas non injected, no pterigion, normal extraocular movements, normal visual acuity.   Mouth mucosa was moist, no exudates in oropharynx, normal gum line, normal roof of the mouth and pillars, normal papillations of the tongue.  NECK:  Supple with good range of motion; no thyromegaly or masses, no JVD or bruits, no cervical adenopathies.No carotid arteries pain, no carotid abnormal pulsation , NO arterial dance.  LYMPHATICS:  No cervical, NO supraclavicular, NO axillary,NO epitrochlear , NO inguinal adenopathy.  CHEST:  Normal excursion of both starla thoraces, normal voice fremitus, no subcutaneous emphysema, normal axillas, no rashes or acanthosis nigricans. Lungs clear to percussion and auscultation, normal breath sounds bilaterally, no wheezing,NO crackles NO ronchi, NO stridor, NO rubs.  CARDIAC AND VASCULAR:  normal rate and regular rhythm, without murmurs,NO rubs NO S3 NO S4 right or left . Normal femoral, popliteal, pedis, brachial and carotid pulses.  ABDOMEN:  Soft, nontender with no organomegaly or masses, no ascites, no collateral circulation,no distention,no Juliano sign, no abdominal pain, no inguinal hernias,no umbilical hernia, no abdominal bruits. Normal bowel sounds.  GENITAL: Not  Performed.  EXTREMITIES  AND SPINE:  No clubbing, cyanosis or edema, no deformities or pain .No kyphosis, scoliosis, deformities or pain in spine, ribs or pelvic bone.  NEUROLOGICAL:  Patient was awake, alert, oriented to time, person and place.                 DIAGNOSTIC      Lab Results   Component Value Date "    WBC 16.12 (H) 08/08/2019    HGB 12.3 (L) 08/08/2019    HCT 38.3 08/08/2019    .1 (H) 08/08/2019     (L) 08/08/2019     Lab Results   Component Value Date    NEUTROABS 3.01 08/08/2019         lymph  11,250     Lab Results   Component Value Date    GLUCOSE 125 (H) 09/14/2016    BUN 13 06/24/2019    CREATININE 0.74 (L) 06/24/2019    EGFRIFNONA 102 06/24/2019    EGFRIFAFRI 124 06/24/2019    BCR 17.6 06/24/2019    CO2 29.6 (H) 06/24/2019    CALCIUM 9.7 06/24/2019    PROTENTOTREF 6.5 06/24/2019    ALBUMIN 4.60 06/24/2019    LABIL2 2.4 06/24/2019    AST 11 06/24/2019    ALT 14 06/24/2019     Sodium   Date Value Ref Range Status   06/24/2019 146 (H) 136 - 145 mmol/L Final   09/14/2016 143 134 - 145 mmol/L Final     Potassium   Date Value Ref Range Status   06/24/2019 4.8 3.5 - 5.2 mmol/L Final   09/14/2016 4.0 3.5 - 4.7 mmol/L Final     Total Bilirubin   Date Value Ref Range Status   06/24/2019 0.5 0.2 - 1.2 mg/dL Final   09/14/2016 0.5 0.1 - 1.2 mg/dL Final     Alkaline Phosphatase   Date Value Ref Range Status   06/24/2019 77 39 - 117 U/L Final   09/14/2016 81 38 - 116 U/L Final   ]  Lab Results   Component Value Date    IRON 114 03/28/2017    TIBC 335 03/28/2017    FERRITIN 88.70 03/28/2017       ASSESSMENT:  1. Chronic lymphocytic leukemia, Koch stage 0 (minimal not palpable splenomegaly, without severe anemia or thrombocytopenia). 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 No B symptom. The patient has no obvious alteration in the laboratory parameters comparing on how the blood counts were in 6/19 having persistent leukocytosis, lymphocytosis, normal hemoglobin, normal platelet count. He has no progressive adenopathy. No progressive splenomegaly. No infections and no autoimmunity.       From his leukemia point of view, I told him the issues that he could develop in case that this becomes symptomatic including fever, chills,  night sweats, pruritus, lack of appetite, weight loss, rashes in the skin and progressive adenopathy. If that is the case he is supposed to give us a contact call. Otherwise, I will advise him to return to see us in 12 months with a CBC for review.  The patient has very extensive perineal candidiasis around the anus and the posterior aspect of the scrotum and groin areas. He was not comfortable talking about this issue with Sanjuana De La Cruz MD.     Under the present premises I advised him to use topical ketoconazole twice a day in affected areas and he will take a tablet of Diflucan 150 mg once a week for 4 weeks.     I went ahead and measured a B12 level given his minor elevation of the MCV as well as folic acid level and an LDH. If any abnormality is found he will be called at home and act upon.     Otherwise I asked him to return back in a year.     .

## 2019-08-09 ENCOUNTER — TELEPHONE (OUTPATIENT)
Dept: ONCOLOGY | Facility: HOSPITAL | Age: 79
End: 2019-08-09

## 2019-08-09 NOTE — TELEPHONE ENCOUNTER
Attempted to call pt back, left VM.    ----- Message from Kamilah Vyas sent at 8/9/2019  3:42 PM EDT -----  Regarding: needs to speak to nurse  Contact: [phone]  Dr Jackman was supposed to call in something  For him

## 2019-09-11 ENCOUNTER — TELEPHONE (OUTPATIENT)
Dept: ONCOLOGY | Facility: HOSPITAL | Age: 79
End: 2019-09-11

## 2019-09-11 NOTE — TELEPHONE ENCOUNTER
Pt calls questioning fluconazole that was prescribed at last visit with Dr. Jackman. Pt states he was aware this is a medication for vaginal infections. Explained to pt that it is a medication used to treat candidiasis infections and is appropriate for him to take. Pt v/u and denied any other questions or concerns.     ----- Message from Nikolai Ring sent at 9/11/2019 11:12 AM EDT -----  Contact: [phone]  Pt has questions about his medication.        [phone]

## 2019-12-09 ENCOUNTER — OFFICE VISIT (OUTPATIENT)
Dept: INTERNAL MEDICINE | Facility: CLINIC | Age: 79
End: 2019-12-09

## 2019-12-09 VITALS
DIASTOLIC BLOOD PRESSURE: 64 MMHG | HEIGHT: 71 IN | BODY MASS INDEX: 27.64 KG/M2 | OXYGEN SATURATION: 96 % | HEART RATE: 71 BPM | SYSTOLIC BLOOD PRESSURE: 92 MMHG | WEIGHT: 197.4 LBS | TEMPERATURE: 97.8 F

## 2019-12-09 DIAGNOSIS — G20 PARKINSON DISEASE (HCC): Primary | ICD-10-CM

## 2019-12-09 DIAGNOSIS — R26.89 BALANCE DISORDER: ICD-10-CM

## 2019-12-09 PROCEDURE — 99213 OFFICE O/P EST LOW 20 MIN: CPT | Performed by: INTERNAL MEDICINE

## 2019-12-09 NOTE — PROGRESS NOTES
Subjective   Driss Ackerman is a 79 y.o. male here today to discuss if possibly had a stroke.  Pt c/o stiffness and speech. Pt states that his eye was crusted and runny nose.  Pt states that physical therapy said his BP changes from sitting to standing.      History of Present Illness   He has seen a pharmacist that rec a ESPARZA for a stroke for his balance and gaitr issues  He says he gets worse as his parkinsons meds wear off later in the day. He is getting better with PT  He has been seeing podiatry for hammer toe    The following portions of the patient's history were reviewed and updated as appropriate: allergies, current medications, past medical history, past social history and problem list.    Review of Systems   All other systems reviewed and are negative.      Objective   Physical Exam   Constitutional: He is oriented to person, place, and time. He appears well-developed and well-nourished.   HENT:   Head: Normocephalic and atraumatic.   Right Ear: External ear normal.   Left Ear: External ear normal.   Mouth/Throat: Oropharynx is clear and moist.   Eyes: Pupils are equal, round, and reactive to light. Conjunctivae and EOM are normal.   Neck: Normal range of motion. No tracheal deviation present. No thyromegaly present.   Cardiovascular: Normal rate, regular rhythm, normal heart sounds and intact distal pulses.   Pulmonary/Chest: Effort normal and breath sounds normal.   Abdominal: Soft. Bowel sounds are normal. He exhibits no distension. There is no tenderness.   Musculoskeletal: Normal range of motion. He exhibits no edema or deformity.   Neurological: He is alert and oriented to person, place, and time.   Skin: Skin is warm and dry.   Psychiatric: He has a normal mood and affect. His behavior is normal. Judgment and thought content normal.   Vitals reviewed.      Vitals:    12/09/19 1024   BP: 92/64   Pulse: 71   Temp:    SpO2: 96%     Body mass index is 27.63 kg/m².       Current Outpatient Medications:    •  aspirin 81 MG EC tablet, Take 81 mg by mouth Daily., Disp: , Rfl:   •  carbidopa-levodopa (SINEMET)  MG per tablet, Take 3 tablets by mouth., Disp: , Rfl:   •  citalopram (CeleXA) 10 MG tablet, , Disp: , Rfl:   •  Coenzyme Q10 (CO Q 10) 100 MG capsule, Take 1 tablet by mouth Daily., Disp: , Rfl:   •  Cyanocobalamin (VITAMIN B-12 ER) 2000 MCG tablet controlled-release, Place 1 tablet under the tongue Daily., Disp: , Rfl:   •  Docusate Calcium (STOOL SOFTENER PO), Take 1 tablet by mouth Daily., Disp: , Rfl:   •  fluticasone (FLONASE) 50 MCG/ACT nasal spray, 2 sprays into each nostril Daily., Disp: 3 bottle, Rfl: 1  •  ketoconazole (NIZORAL) 2 % cream, Apply  topically to the appropriate area as directed Every 12 (Twelve) Hours., Disp: 60 g, Rfl: 3  •  mesalamine (LIALDA) 1.2 G EC tablet, Take 3 tablets by mouth Daily. (Patient taking differently: Take 3.6 g by mouth Daily. Pt takes 4 tabs a day), Disp: 270 tablet, Rfl: 1  •  Multiple Vitamins-Minerals (MULTIVITAMIN PO), Take 1 tablet by mouth daily., Disp: , Rfl:   •  Omega-3 Fatty Acids (FISH OIL) 1000 MG capsule capsule, Take  by mouth daily with breakfast., Disp: , Rfl:   •  Unable to find, 1 each 1 (One) Time. Med Name: hemp oil, Disp: , Rfl:       Assessment/Plan   Diagnoses and all orders for this visit:    Parkinson disease (CMS/[facility])    Balance disorder      1. Parkinsons- disease  I really think his neuro sx are related to this.  I did discuss with him doing a stroke ESPARZA and he does not want to do this at present.   He has fallen so I really do not want to do a blood thinner again   2. Balance disorder- related to the parkinsons

## 2020-03-13 DIAGNOSIS — I10 BENIGN ESSENTIAL HYPERTENSION: ICD-10-CM

## 2020-03-13 DIAGNOSIS — R73.09 ELEVATED GLUCOSE: ICD-10-CM

## 2020-03-13 DIAGNOSIS — E78.00 HYPERCHOLESTEROLEMIA: Primary | ICD-10-CM

## 2020-04-23 ENCOUNTER — TELEPHONE (OUTPATIENT)
Dept: INTERNAL MEDICINE | Facility: CLINIC | Age: 80
End: 2020-04-23

## 2020-04-23 NOTE — TELEPHONE ENCOUNTER
Pt called [facility] himself. They called to let you know that he will be having a consult in the next few weeks.

## 2020-04-23 NOTE — TELEPHONE ENCOUNTER
ESTELA CALLED FROM [facility] TO GIVE AN UPDATE ON PATIENT ABOUT HOME CARE.      PLEASE CALL BACK

## 2020-06-29 LAB
ALBUMIN SERPL-MCNC: 4.7 G/DL (ref 3.5–5.2)
ALBUMIN/GLOB SERPL: 2.5 G/DL
ALP SERPL-CCNC: 82 U/L (ref 39–117)
ALT SERPL-CCNC: 9 U/L (ref 1–41)
AST SERPL-CCNC: 13 U/L (ref 1–40)
BASOPHILS # BLD AUTO: ABNORMAL 10*3/UL
BASOPHILS # BLD MANUAL: 0 10*3/MM3 (ref 0–0.2)
BASOPHILS NFR BLD MANUAL: 0 % (ref 0–1.5)
BILIRUB SERPL-MCNC: 0.5 MG/DL (ref 0.2–1.2)
BUN SERPL-MCNC: 11 MG/DL (ref 8–23)
BUN/CREAT SERPL: 14.1 (ref 7–25)
CALCIUM SERPL-MCNC: 9.5 MG/DL (ref 8.6–10.5)
CHLORIDE SERPL-SCNC: 105 MMOL/L (ref 98–107)
CHOLEST SERPL-MCNC: 192 MG/DL (ref 0–200)
CO2 SERPL-SCNC: 28.3 MMOL/L (ref 22–29)
CREAT SERPL-MCNC: 0.78 MG/DL (ref 0.76–1.27)
DIFFERENTIAL COMMENT: ABNORMAL
EOSINOPHIL # BLD AUTO: ABNORMAL 10*3/UL
EOSINOPHIL # BLD MANUAL: 0.36 10*3/MM3 (ref 0–0.4)
EOSINOPHIL NFR BLD AUTO: ABNORMAL %
EOSINOPHIL NFR BLD MANUAL: 2 % (ref 0.3–6.2)
ERYTHROCYTE [DISTWIDTH] IN BLOOD BY AUTOMATED COUNT: 13.2 % (ref 12.3–15.4)
GLOBULIN SER CALC-MCNC: 1.9 GM/DL
GLUCOSE SERPL-MCNC: 98 MG/DL (ref 65–99)
HBA1C MFR BLD: 5 % (ref 4.8–5.6)
HCT VFR BLD AUTO: 37.6 % (ref 37.5–51)
HDLC SERPL-MCNC: 39 MG/DL (ref 40–60)
HGB BLD-MCNC: 12.7 G/DL (ref 13–17.7)
LDLC SERPL CALC-MCNC: 125 MG/DL (ref 0–100)
LDLC/HDLC SERPL: 3.21 {RATIO}
LYMPHOCYTES # BLD AUTO: ABNORMAL 10*3/UL
LYMPHOCYTES # BLD MANUAL: 10.48 10*3/MM3 (ref 0.7–3.1)
LYMPHOCYTES NFR BLD AUTO: ABNORMAL %
LYMPHOCYTES NFR BLD MANUAL: 59 % (ref 19.6–45.3)
MCH RBC QN AUTO: 32.4 PG (ref 26.6–33)
MCHC RBC AUTO-ENTMCNC: 33.8 G/DL (ref 31.5–35.7)
MCV RBC AUTO: 95.9 FL (ref 79–97)
MONOCYTES # BLD MANUAL: 0.53 10*3/MM3 (ref 0.1–0.9)
MONOCYTES NFR BLD AUTO: ABNORMAL %
MONOCYTES NFR BLD MANUAL: 3 % (ref 5–12)
NEUTROPHILS # BLD MANUAL: 6.39 10*3/MM3 (ref 1.7–7)
NEUTROPHILS NFR BLD AUTO: ABNORMAL %
NEUTROPHILS NFR BLD MANUAL: 36 % (ref 42.7–76)
PLATELET # BLD AUTO: 164 10*3/MM3 (ref 140–450)
PLATELET BLD QL SMEAR: ABNORMAL
POTASSIUM SERPL-SCNC: 4.4 MMOL/L (ref 3.5–5.2)
PROT SERPL-MCNC: 6.6 G/DL (ref 6–8.5)
RBC # BLD AUTO: 3.92 10*6/MM3 (ref 4.14–5.8)
RBC MORPH BLD: ABNORMAL
SODIUM SERPL-SCNC: 142 MMOL/L (ref 136–145)
TRIGL SERPL-MCNC: 140 MG/DL (ref 0–150)
TSH SERPL DL<=0.005 MIU/L-ACNC: 1.17 UIU/ML (ref 0.27–4.2)
VLDLC SERPL CALC-MCNC: 28 MG/DL
WBC # BLD AUTO: 17.76 10*3/MM3 (ref 3.4–10.8)

## 2020-07-06 ENCOUNTER — OFFICE VISIT (OUTPATIENT)
Dept: INTERNAL MEDICINE | Facility: CLINIC | Age: 80
End: 2020-07-06

## 2020-07-06 VITALS
WEIGHT: 191.7 LBS | DIASTOLIC BLOOD PRESSURE: 70 MMHG | SYSTOLIC BLOOD PRESSURE: 110 MMHG | BODY MASS INDEX: 26.84 KG/M2 | HEART RATE: 56 BPM | OXYGEN SATURATION: 96 % | HEIGHT: 71 IN

## 2020-07-06 DIAGNOSIS — E78.5 HYPERLIPIDEMIA, UNSPECIFIED HYPERLIPIDEMIA TYPE: ICD-10-CM

## 2020-07-06 DIAGNOSIS — G20 PARKINSON DISEASE (HCC): Primary | ICD-10-CM

## 2020-07-06 DIAGNOSIS — C91.10 CHRONIC LYMPHOCYTIC LEUKEMIA (HCC): ICD-10-CM

## 2020-07-06 DIAGNOSIS — J30.9 ALLERGIC RHINITIS, UNSPECIFIED SEASONALITY, UNSPECIFIED TRIGGER: ICD-10-CM
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
## 2020-07-06 DIAGNOSIS — E55.9 VITAMIN D DEFICIENCY: ICD-10-CM

## 2020-07-06 PROCEDURE — 99214 OFFICE O/P EST MOD 30 MIN: CPT | Performed by: INTERNAL MEDICINE

## 2020-07-06 RX ORDER — MIDODRINE HYDROCHLORIDE 2.5 MG/1
2.5 TABLET ORAL 2 TIMES DAILY
COMMUNITY
Start: 2020-07-01 | End: 2021-12-15

## 2020-07-06 RX ORDER — ACETAMINOPHEN 500 MG
500 TABLET ORAL EVERY 6 HOURS PRN
COMMUNITY

## 2020-07-06 RX ORDER — CITALOPRAM 20 MG/1
20 TABLET ORAL DAILY
COMMUNITY
Start: 2020-05-29 | End: 2020-11-10 | Stop reason: SDUPTHER

## 2020-07-06 RX ORDER — AZELASTINE HYDROCHLORIDE 0.5 MG/ML
1 SOLUTION/ DROPS OPHTHALMIC 2 TIMES DAILY
Qty: 6 ML | Refills: 12 | Status: SHIPPED | OUTPATIENT
Start: 2020-07-06 | End: 2020-08-19

## 2020-07-06 NOTE — PROGRESS NOTES
Subjective   Driss Ackerman is a 80 y.o. male here today to f/u on parkinson disease, HTN and hyperlipidemia.  Pt c/o swelling due to increased sodium intake, not being able to feel feet and memory.          History of Present Illness   He has been falling more and has been working with PT on this.  He has to stop for now as he is out of coverage  But he is trying to do exercises daily for this  He is using the walker  He does see the neurologist  He has been having trouble with the cpap and cannot keep it on  He does have a FU with sleep this week  He does feel like the celexa does help him  He does sleep better with the CBD oil    The following portions of the patient's history were reviewed and updated as appropriate: allergies, current medications, past medical history, past social history and problem list.  He does say her eats better    Review of Systems   All other systems reviewed and are negative.      Objective   Physical Exam   Constitutional: He is oriented to person, place, and time. He appears well-developed and well-nourished.   HENT:   Head: Normocephalic and atraumatic.   Right Ear: External ear normal.   Left Ear: External ear normal.   Mouth/Throat: Oropharynx is clear and moist.   Eyes: Pupils are equal, round, and reactive to light. Conjunctivae and EOM are normal.   Neck: Normal range of motion. No tracheal deviation present. No thyromegaly present.   Cardiovascular: Normal rate, regular rhythm, normal heart sounds and intact distal pulses.   Pulmonary/Chest: Effort normal and breath sounds normal.   Abdominal: Soft. Bowel sounds are normal. He exhibits no distension. There is no tenderness.   Musculoskeletal: Normal range of motion. He exhibits no edema or deformity.   Neurological: He is alert and oriented to person, place, and time.   Skin: Skin is warm and dry.   Psychiatric: He has a normal mood and affect. His behavior is normal. Judgment and thought content normal.   Vitals  reviewed.      Vitals:    07/06/20 1134   BP: 110/70   Pulse:    SpO2:      Body mass index is 26.83 kg/m².       Orders Only on 03/13/2020   Component Date Value Ref Range Status   • Glucose 06/29/2020 98  65 - 99 mg/dL Final   • BUN 06/29/2020 11  8 - 23 mg/dL Final   • Creatinine 06/29/2020 0.78  0.76 - 1.27 mg/dL Final   • eGFR Non African Am 06/29/2020 96  >60 mL/min/1.73 Final    Comment: The MDRD GFR formula is only valid for adults with stable  renal function between ages 18 and 70.     • eGFR  Am 06/29/2020 116  >60 mL/min/1.73 Final   • BUN/Creatinine Ratio 06/29/2020 14.1  7.0 - 25.0 Final   • Sodium 06/29/2020 142  136 - 145 mmol/L Final   • Potassium 06/29/2020 4.4  3.5 - 5.2 mmol/L Final   • Chloride 06/29/2020 105  98 - 107 mmol/L Final   • Total CO2 06/29/2020 28.3  22.0 - 29.0 mmol/L Final   • Calcium 06/29/2020 9.5  8.6 - 10.5 mg/dL Final   • Total Protein 06/29/2020 6.6  6.0 - 8.5 g/dL Final   • Albumin 06/29/2020 4.70  3.50 - 5.20 g/dL Final   • Globulin 06/29/2020 1.9  gm/dL Final   • A/G Ratio 06/29/2020 2.5  g/dL Final   • Total Bilirubin 06/29/2020 0.5  0.2 - 1.2 mg/dL Final   • Alkaline Phosphatase 06/29/2020 82  39 - 117 U/L Final   • AST (SGOT) 06/29/2020 13  1 - 40 U/L Final   • ALT (SGPT) 06/29/2020 9  1 - 41 U/L Final   • Total Cholesterol 06/29/2020 192  0 - 200 mg/dL Final   • Triglycerides 06/29/2020 140  0 - 150 mg/dL Final   • HDL Cholesterol 06/29/2020 39* 40 - 60 mg/dL Final   • VLDL Cholesterol 06/29/2020 28  mg/dL Final   • LDL Cholesterol  06/29/2020 125* 0 - 100 mg/dL Final   • LDL/HDL Ratio 06/29/2020 3.21   Final   • TSH 06/29/2020 1.170  0.270 - 4.200 uIU/mL Final   • WBC 06/29/2020 17.76* 3.40 - 10.80 10*3/mm3 Final   • RBC 06/29/2020 3.92* 4.14 - 5.80 10*6/mm3 Final   • Hemoglobin 06/29/2020 12.7* 13.0 - 17.7 g/dL Final   • Hematocrit 06/29/2020 37.6  37.5 - 51.0 % Final   • MCV 06/29/2020 95.9  79.0 - 97.0 fL Final   • MCH 06/29/2020 32.4  26.6 - 33.0 pg Final    • MCHC 06/29/2020 33.8  31.5 - 35.7 g/dL Final   • RDW 06/29/2020 13.2  12.3 - 15.4 % Final   • Platelets 06/29/2020 164  140 - 450 10*3/mm3 Final   • Neutrophil Rel % 06/29/2020 CANCELED   Final-Edited    Comment: Test not performed    Result canceled by the ancillary.     • Lymphocyte Rel % 06/29/2020 CANCELED   Final-Edited    Comment: Test not performed    Result canceled by the ancillary.     • Monocyte Rel % 06/29/2020 CANCELED   Final-Edited    Comment: Test not performed    Result canceled by the ancillary.     • Eosinophil Rel % 06/29/2020 CANCELED   Final-Edited    Comment: Test not performed    Result canceled by the ancillary.     • Lymphocytes Absolute 06/29/2020 CANCELED   Final-Edited    Comment: Test not performed    Result canceled by the ancillary.     • Eosinophils Absolute 06/29/2020 CANCELED   Final-Edited    Comment: Test not performed    Result canceled by the ancillary.     • Basophils Absolute 06/29/2020 CANCELED   Final-Edited    Comment: Test not performed    Result canceled by the ancillary.     • Hemoglobin A1C 06/29/2020 5.00  4.80 - 5.60 % Final    Comment: Hemoglobin A1C Ranges:  Increased Risk for Diabetes  5.7% to 6.4%  Diabetes                     >= 6.5%  Diabetic Goal                < 7.0%     • Neutrophil Rel % 06/29/2020 36.0* 42.7 - 76.0 % Final   • Lymphocyte Rel % 06/29/2020 59.0* 19.6 - 45.3 % Final   • Monocyte Rel % 06/29/2020 3.0* 5.0 - 12.0 % Final   • Eosinophil Rel % 06/29/2020 2.0  0.3 - 6.2 % Final   • Basophil Rel % 06/29/2020 0.0  0.0 - 1.5 % Final   • Neutrophils Absolute 06/29/2020 6.39  1.70 - 7.00 10*3/mm3 Final   • Lymphocytes Absolute 06/29/2020 10.48* 0.70 - 3.10 10*3/mm3 Final   • Monocytes Absolute 06/29/2020 0.53  0.10 - 0.90 10*3/mm3 Final   • Eosinophil Abs 06/29/2020 0.36  0.00 - 0.40 10*3/mm3 Final   • Basophils Absolute 06/29/2020 0.00  0.00 - 0.20 10*3/mm3 Final   • Differential Comment 06/29/2020 Comment   Final    SMUDGE CELLS                  Large/3+                    None Seen  N   • Comment 06/29/2020 Comment   Final    RBC MORPHOLOGY               Normal                      Normal     N   • Plt Comment 06/29/2020 Comment   Final    PLATELET MORPHOLOGY          Normal                      Normal     N       Current Outpatient Medications:   •  acetaminophen (TYLENOL) 500 MG tablet, Take 500 mg by mouth Every 6 (Six) Hours As Needed for Mild Pain ., Disp: , Rfl:   •  aspirin 81 MG EC tablet, Take 81 mg by mouth Daily., Disp: , Rfl:   •  carbidopa-levodopa (SINEMET)  MG per tablet, Take 3 tablets by mouth., Disp: , Rfl:   •  citalopram (CeleXA) 20 MG tablet, Take 20 mg by mouth Daily., Disp: , Rfl:   •  Coenzyme Q10 (CO Q 10) 100 MG capsule, Take 1 tablet by mouth Daily., Disp: , Rfl:   •  Cyanocobalamin (VITAMIN B-12 ER) 2000 MCG tablet controlled-release, Place 1 tablet under the tongue Daily., Disp: , Rfl:   •  Docusate Calcium (STOOL SOFTENER PO), Take 1 tablet by mouth Daily., Disp: , Rfl:   •  fluticasone (FLONASE) 50 MCG/ACT nasal spray, 2 sprays into each nostril Daily., Disp: 3 bottle, Rfl: 1  •  mesalamine (LIALDA) 1.2 G EC tablet, Take 3 tablets by mouth Daily. (Patient taking differently: Take 3.6 g by mouth Daily. Pt takes 4 tabs a day), Disp: 270 tablet, Rfl: 1  •  midodrine (PROAMATINE) 2.5 MG tablet, Take 2.5 mg by mouth 2 (Two) Times a Day., Disp: , Rfl:   •  Multiple Vitamins-Minerals (MULTIVITAMIN PO), Take 1 tablet by mouth daily., Disp: , Rfl:   •  Omega-3 Fatty Acids (FISH OIL) 1000 MG capsule capsule, Take  by mouth daily with breakfast., Disp: , Rfl:   •  Unable to find, 1 each 1 (One) Time. Med Name: hemp oil, Disp: , Rfl:   •  azelastine (OPTIVAR) 0.05 % ophthalmic solution, Administer 1 drop to both eyes 2 (Two) Times a Day., Disp: 6 mL, Rfl: 12        Assessment/Plan   Diagnoses and all orders for this visit:    Parkinson disease (CMS/HCC)    Chronic lymphocytic leukemia (CMS/HCC)    Fall, sequela    Allergic  rhinitis, unspecified seasonality, unspecified trigger    Other orders  -     azelastine (OPTIVAR) 0.05 % ophthalmic solution; Administer 1 drop to both eyes 2 (Two) Times a Day.        1.  Parkinsons- cont the PT for now as it does helppatient  2,  CLL-  Stable  Sees heme  3.  Allergic iritis-  Try optivar eye drops  4. Depression-cont the celexa  5.  Incontinence: Patient cannot tolerate the use of condom catheters due to irritation and sensitivity to be materials

## 2020-08-06 ENCOUNTER — OFFICE VISIT (OUTPATIENT)
Dept: ONCOLOGY | Facility: CLINIC | Age: 80
End: 2020-08-06

## 2020-08-06 ENCOUNTER — LAB (OUTPATIENT)
Dept: LAB | Facility: HOSPITAL | Age: 80
End: 2020-08-06

## 2020-08-06 VITALS
RESPIRATION RATE: 18 BRPM | SYSTOLIC BLOOD PRESSURE: 131 MMHG | HEART RATE: 69 BPM | TEMPERATURE: 97.7 F | BODY MASS INDEX: 26.7 KG/M2 | WEIGHT: 190.7 LBS | DIASTOLIC BLOOD PRESSURE: 73 MMHG | HEIGHT: 71 IN | OXYGEN SATURATION: 96 %

## 2020-08-06 DIAGNOSIS — C91.10 CHRONIC LYMPHOCYTIC LEUKEMIA (HCC): ICD-10-CM

## 2020-08-06 DIAGNOSIS — B37.2 MONILIAL INTERTRIGO: ICD-10-CM

## 2020-08-06 DIAGNOSIS — C91.10 CHRONIC LYMPHOCYTIC LEUKEMIA (HCC): Primary | ICD-10-CM

## 2020-08-06 LAB
ALBUMIN SERPL-MCNC: 4.5 G/DL (ref 3.5–5.2)
ALBUMIN/GLOB SERPL: 2.1 G/DL (ref 1.1–2.4)
ALP SERPL-CCNC: 61 U/L (ref 38–116)
ALT SERPL W P-5'-P-CCNC: <5 U/L (ref 0–41)
ANION GAP SERPL CALCULATED.3IONS-SCNC: 10.6 MMOL/L (ref 5–15)
AST SERPL-CCNC: 14 U/L (ref 0–40)
BASOPHILS # BLD AUTO: 0.06 10*3/MM3 (ref 0–0.2)
BASOPHILS NFR BLD AUTO: 0.3 % (ref 0–1.5)
BILIRUB SERPL-MCNC: 0.4 MG/DL (ref 0.2–1.2)
BUN SERPL-MCNC: 14 MG/DL (ref 6–20)
BUN/CREAT SERPL: 23 (ref 7.3–30)
CALCIUM SPEC-SCNC: 9.4 MG/DL (ref 8.5–10.2)
CHLORIDE SERPL-SCNC: 105 MMOL/L (ref 98–107)
CO2 SERPL-SCNC: 26.4 MMOL/L (ref 22–29)
CREAT SERPL-MCNC: 0.61 MG/DL (ref 0.7–1.3)
DEPRECATED RDW RBC AUTO: 47.6 FL (ref 37–54)
EOSINOPHIL # BLD AUTO: 0.1 10*3/MM3 (ref 0–0.4)
EOSINOPHIL NFR BLD AUTO: 0.6 % (ref 0.3–6.2)
ERYTHROCYTE [DISTWIDTH] IN BLOOD BY AUTOMATED COUNT: 13 % (ref 12.3–15.4)
FOLATE SERPL-MCNC: >20 NG/ML (ref 4.78–24.2)
GFR SERPL CREATININE-BSD FRML MDRD: 127 ML/MIN/1.73
GLOBULIN UR ELPH-MCNC: 2.1 GM/DL (ref 1.8–3.5)
GLUCOSE SERPL-MCNC: 126 MG/DL (ref 74–124)
HCT VFR BLD AUTO: 38.6 % (ref 37.5–51)
HGB BLD-MCNC: 12.5 G/DL (ref 13–17.7)
IMM GRANULOCYTES # BLD AUTO: 0.03 10*3/MM3 (ref 0–0.05)
IMM GRANULOCYTES NFR BLD AUTO: 0.2 % (ref 0–0.5)
LDH SERPL-CCNC: 139 U/L (ref 99–259)
LYMPHOCYTES # BLD AUTO: 13.79 10*3/MM3 (ref 0.7–3.1)
LYMPHOCYTES NFR BLD AUTO: 76.6 % (ref 19.6–45.3)
MCH RBC QN AUTO: 32.3 PG (ref 26.6–33)
MCHC RBC AUTO-ENTMCNC: 32.4 G/DL (ref 31.5–35.7)
MCV RBC AUTO: 99.7 FL (ref 79–97)
MONOCYTES # BLD AUTO: 0.33 10*3/MM3 (ref 0.1–0.9)
MONOCYTES NFR BLD AUTO: 1.8 % (ref 5–12)
NEUTROPHILS NFR BLD AUTO: 20.5 % (ref 42.7–76)
NEUTROPHILS NFR BLD AUTO: 3.69 10*3/MM3 (ref 1.7–7)
NRBC BLD AUTO-RTO: 0 /100 WBC (ref 0–0.2)
PLATELET # BLD AUTO: 168 10*3/MM3 (ref 140–450)
PMV BLD AUTO: 9 FL (ref 6–12)
POTASSIUM SERPL-SCNC: 4.8 MMOL/L (ref 3.5–4.7)
PROT SERPL-MCNC: 6.6 G/DL (ref 6.3–8)
RBC # BLD AUTO: 3.87 10*6/MM3 (ref 4.14–5.8)
SODIUM SERPL-SCNC: 142 MMOL/L (ref 134–145)
VIT B12 BLD-MCNC: 697 PG/ML (ref 211–946)
WBC # BLD AUTO: 18 10*3/MM3 (ref 3.4–10.8)

## 2020-08-06 PROCEDURE — 36415 COLL VENOUS BLD VENIPUNCTURE: CPT | Performed by: INTERNAL MEDICINE

## 2020-08-06 PROCEDURE — 80053 COMPREHEN METABOLIC PANEL: CPT

## 2020-08-06 PROCEDURE — 99214 OFFICE O/P EST MOD 30 MIN: CPT | Performed by: INTERNAL MEDICINE

## 2020-08-06 PROCEDURE — 82607 VITAMIN B-12: CPT | Performed by: INTERNAL MEDICINE

## 2020-08-06 PROCEDURE — 85025 COMPLETE CBC W/AUTO DIFF WBC: CPT

## 2020-08-06 PROCEDURE — 82746 ASSAY OF FOLIC ACID SERUM: CPT | Performed by: INTERNAL MEDICINE

## 2020-08-06 PROCEDURE — 83615 LACTATE (LD) (LDH) ENZYME: CPT | Performed by: INTERNAL MEDICINE

## 2020-08-06 NOTE — PROGRESS NOTES
REASON FOR FOLLOWUP: CHRONIC LYMPHOCYTIC LEUKEMIA. PATEL STAGE 0     HISTORY OF PRESENT ILLNESS:  PATIENT WAS CALLED THE DAY BEFORE BY THE OFFICE TO ASK FOR SYMPTOMS THAT COULD BE CONSISTENT WITH CORONAVIRUS INFECTION, AND BEING NEGATIVE WAS SCHEDULED TO BE SEEN IN THE OFFICE TODAY. SIMILAR QUESTIONING TODAY INCLUDING, CHILLS, FEVER, NEW COUGH, SHORTNESS OF BREATH, DIARRHEA,DIFFUSE BODY ACHES  AND CHANGES IN SMELL OR TASTE WERE NEGATIVE.DURING THE VISIT WITH THE PATIENT TODAY , PATIENT HAD FACE MASK, I HAD PROPPER PROTECTIVE EQUIPMENT, AND I DID HAND HYGIENE WITH SOAP AND WATER BEFORE AND AFTER THE VISIT.  This patient returns today to the office for follow up. He is here today in company of his wife stating that he has been having more and more falls recently in spite of using his walker all of the time. His balance and issues pertinent to his Parkinson's disease are worse and he hs not been having any access to his neurologist. The appointments with them have been cancelled on several occasions. His tremor is not any worse. He has not had any obvious paralysis. He is able to stand up in 1 time trip but he has but he has difficulty keeping his balance. The patient's appetite is good, his bowel activity and urination remain unchanged even though he has frequency to urinate through the night and he has been advised in regard to a catheter to be placed in the bladder at least for nighttime because otherwise he will need to go to the bathroom on many occasions and he is now wearing a diaper at nighttime. He has not had any infections. He denies any adenopathies. He has not had any cardiovascular or respiratory issues. In the multiple traumas he has had just scratches and no fractures. This is again related to the shuffling of his right foot mainly that even though he wants to move it sometimes he does not go where he wants to go.         Past Medical History:   Diagnosis Date   • Actinic keratosis    • Allergic rhinitis     • Anemia    • Anxiety    • Arteriosclerosis of carotid artery    • Arthritis    • Atrial fibrillation (CMS/HCC)     ABLATION   • Atrial flutter (CMS/HCC)    • CAD (coronary artery disease)    • Cardiomyopathy (CMS/HCC)    • Constipation    • Depression    • Eczema    • Enlarged prostate    • Fatigue    • H/O heart artery stent     X5   • Hearing loss    • HTN (hypertension)    • Hypercholesteremia    • Hypogonadism male    • Leukocytosis    • Memory change    • Mitral regurgitation    • Myalgia    • Osteoarthritis     ANKLE, FOOT, RIGHT   • Parkinson's disease (CMS/HCC)    • Prostatitis    • Pulmonic regurgitation    • Right ankle pain    • Skin cancer    • Sleep apnea     CPAP   • Ulcerative colitis (CMS/HCC)      Social History     Socioeconomic History   • Marital status:      Spouse name: Trina Ackerman   • Number of children: 6   • Years of education: Not on file   • Highest education level: Not on file   Occupational History     Employer: RETIRED   Tobacco Use   • Smoking status: Never Smoker   • Smokeless tobacco: Never Used   Substance and Sexual Activity   • Alcohol use: No   • Drug use: No     Family History   Problem Relation Age of Onset   • Heart disease Mother    • Hypertension Mother    • Heart disease Father    • Heart failure Father                  HEMATOLOGY/ONCOLOGY HISTORY:   Patient was initially seen on 3/24/2016 for evaluation leukocytosis. The patient previously was seen by Dr. Landon Phillips and since Dr. Phillips left town recently, he has been seeing Dr. De La Cruz. This patient was seen by orthopedic surgeon, Dr. Gonzalez, for planned right ankle surgical procedure. However, because of elevated WBC patient is referred to us for evaluation and his surgery has been on hold.      Patient reports no fever, sweating, chills and especially no night sweats. He has no significant weight losses (he did have some weight loss after the surgery but has regained). The patient otherwise has no B symptoms  or other specific complaints.      According to the patient, he was told having elevated WBC counts in October of 2015 when he had right knee replacement. Per computer records, the last time he had normal WBC was back in July of 2013 when he had laboratory study at Dr. Huntley’s office on 07/16/2013. The study reported a total WBC of 5200 including neutrophils 2600 and lymphocytes 2300. His hemoglobin was 14.7 and platelets 173,000. MCV was 102. His chemistry lab reported normal creatinine of 0.71, normal electrolytes and normal liver function panel. His TSH was also normal at 1.28 and vitamin D at 34.8 ng/mL. There were no CBC results available for review until 10/06/2015 when he had total WBC of 12,800 including neutrophils 2900 and lymphocytes 9600. His hemoglobin was 12.4, MCV 91, platelets 171,000. At that time patient had right knee replacement. Chemistry lab showed normal TSH of 1.8 and vitamin D at 29.6 ng/mL.     On April 16 of 2015, laboratory study on the day of surgery reported a total WBC of 13,300 including neutrophils 2400, lymphocytes 10,400, hemoglobin 13.5 and platelets 149,000. MCV was 95. Postop hemoglobin was 10.5 on April 21 2015.      On 3/24/16, the day of his initial oncology evaluation, his total WBC was 10,960, ANC 3000 and lymphocyte 7,210, Platelet 147,000 and hemoglobin 12.3 g/dl. , normal CMP. Serum iron 150, TIBC 321, iron saturation 47, ferritin 60.5 ng/ml, folate 16.6 ng/ml, B12 at 625 pg/ml.      Peripheral blood Flowcytometry study on 3/24/16 reported chronic lymphocytic leukemia. Negative for CD38 and ZAP70 by flow. The CLL panel reported positive for 55.5% cells deletion of chrom 13q14 DLEU1/DLEU2, negative for all the others.      CT scan on 3/29/2016 reported the neck and chest are largely unremarkable. No mass or adenopathy is present at these levels. The spleen is enlarged 16 cm at the craniocaudal extent. There are some mildly prominent lymph nodes in the bilateral  inguinal regions with the largest node at the right inguinal level measuring up to 2.7 cm long axis.  . No further adenopathy is identified within the abdomen or pelvis.      Allergies   Allergen Reactions   • Penicillins Rash     Current Outpatient Medications on File Prior to Visit   Medication Sig Dispense Refill   • acetaminophen (TYLENOL) 500 MG tablet Take 500 mg by mouth Every 6 (Six) Hours As Needed for Mild Pain .     • aspirin 81 MG EC tablet Take 81 mg by mouth Daily.     • azelastine (OPTIVAR) 0.05 % ophthalmic solution Administer 1 drop to both eyes 2 (Two) Times a Day. 6 mL 12   • carbidopa-levodopa (SINEMET)  MG per tablet Take 3 tablets by mouth.     • citalopram (CeleXA) 20 MG tablet Take 20 mg by mouth Daily.     • Coenzyme Q10 (CO Q 10) 100 MG capsule Take 1 tablet by mouth Daily.     • Cyanocobalamin (VITAMIN B-12 ER) 2000 MCG tablet controlled-release Place 1 tablet under the tongue Daily.     • Docusate Calcium (STOOL SOFTENER PO) Take 1 tablet by mouth Daily.     • fluticasone (FLONASE) 50 MCG/ACT nasal spray 2 sprays into each nostril Daily. 3 bottle 1   • mesalamine (LIALDA) 1.2 G EC tablet Take 3 tablets by mouth Daily. (Patient taking differently: Take 3.6 g by mouth Daily. Pt takes 4 tabs a day) 270 tablet 1   • midodrine (PROAMATINE) 2.5 MG tablet Take 2.5 mg by mouth 2 (Two) Times a Day.     • Multiple Vitamins-Minerals (MULTIVITAMIN PO) Take 1 tablet by mouth daily.     • Omega-3 Fatty Acids (FISH OIL) 1000 MG capsule capsule Take  by mouth daily with breakfast.     • Unable to find 1 each 1 (One) Time. Med Name: hemp oil       No current facility-administered medications on file prior to visit.           Review of Systems            General: no fever, no chills,  fatigue,no weight changes, no lack of appetite.  Eyes: no epiphora, xerophthalmia,conjunctivitis, pain, glaucoma, blurred vision, blindness, secretion, photophobia, proptosis, diplopia.  Ears: no otorrhea, tinnitus,  "otorrhagia, deafness, pain, vertigo.  Nose: no rhinorrhea, no epistaxis, no alteration in perception of odors, no sinuses pressure.  Mouth: no alteration in gums or teeth,  No ulcers, no difficulty with mastication or deglut ion, no odynophagia.  Neck: no masses or pain, no thyroid alterations, no pain in muscles or arteries, no carotid odynia, no crepitation.  Respiratory: no cough, no sputum production,no dyspnea,no trepopnea, no pleuritic pain,no hemoptysis.  Heart: no syncope, no irregularity, no palpitations, no angina,no orthopnea,no paroxysmal nocturnal dyspnea.  Vascular Venous: no tenderness,no edema,no palpable cords,no postphlebitic syndrome, no skin changes no ulcerations.  Vascular Arterial: no distal ischemia, noclaudication, no gangrene, no neuropathic ischemic pain, no skin ulcers, no paleness no cyanosis.  GI: no dysphagia, no odynophagia, no regurgitation, no heartburn,no indigestion,no nausea,no vomiting,no hematemesis ,no melena,no jaundice,no distention, no obstipation,no enterorrhagia,no proctalgia,no anal  lesions, no changes in bowel habits.  :  frequency,  hesitancy, no hematuria, no discharge,no  pain.INCONTINENCE  Musculoskeletal: no muscle or tendon pain or inflammation,no  joint pain, no edema, SEVERE functional limitation,no fasciculations, no mass.  Neurologic: no headache, no seizures, no alterations on Craneal nerves, LE motor deficit, no sensory deficit, POOR coordination, no alteration in memory,normal orientation, calculation,normal writting, verbal and written language.FREQUENT FALLS  Skin: no rashes,no pruritus no localized lesions.  Psychiatric: no anxiety, no depression,no agitation, no delusions, proper insight.    Vitals:    08/06/20 1408   BP: 131/73   Pulse: 69   Resp: 18   Temp: 97.7 °F (36.5 °C)   TempSrc: Temporal   SpO2: 96%   Weight: 86.5 kg (190 lb 11.2 oz)   Height: 180 cm (70.87\")   PainSc: 0-No pain     Physical Exam     Patient screened  for depression based on " a PHQ-9 score of 3 on 8/6/2020.     This patient's ACP documentation is up to date, and there's nothing further left to document.    GENERAL ASPECT: IN GOOD HEALTH WALKING THROUGH THE ROOM NO DIFFICULTIES, NO PAIN.PROPER NUTRITION.WELL KEPT PHYSICALLY.  EYES : NOT JAUNDICED, PUPILS WERE EQUAL.  NECK: NO CERVICAL ADENOPATHIES OR MASSES OR THYROID ENLARGEMENT.  HEART: REGULAR ,NO MURMURS , NO GALLOPS, NO RUBS.  ABDOMEN: NOT DISTENDED, NO PAIN, NO LIVER OR SPLEEN ENLARGEMENT, NO ASCITES, NO COLLATERAL CIRCULATION.  EXTREMITIES: NO EDEMA, NO PAIN, NO CLUBBING, NO DEFORMITIES.  NEUROLOGIC: NORMAL CONVERSATION, MEMORY , SPEECH . MINIMAL TREMOR IN HANDS, SHUFFLING R FOOT NO DROP FOOT NO PARALYSIS  SKIN: NO LESIONS.      Advance Care Planning   ACP discussion was held with the patient during this visit. Patient has an advance directive in EMR which is still valid.        DIAGNOSTIC      Lab Results   Component Value Date    WBC 18.00 (H) 08/06/2020    HGB 12.5 (L) 08/06/2020    HCT 38.6 08/06/2020    MCV 99.7 (H) 08/06/2020     08/06/2020     Lab Results   Component Value Date    NEUTROABS 3.69 08/06/2020         lymph  11,250     Lab Results   Component Value Date    GLUCOSE 110 08/08/2019    BUN 11 06/29/2020    CREATININE 0.78 06/29/2020    EGFRIFNONA 96 06/29/2020    EGFRIFAFRI 116 06/29/2020    BCR 14.1 06/29/2020    CO2 28.3 06/29/2020    CALCIUM 9.5 06/29/2020    PROTENTOTREF 6.6 06/29/2020    ALBUMIN 4.70 06/29/2020    LABIL2 2.5 06/29/2020    AST 13 06/29/2020    ALT 9 06/29/2020     Component      Latest Ref Rng & Units 8/8/2019 6/29/2020 6/29/2020 8/6/2020           1:50 PM 10:10 AM 10:10 AM  1:57 PM   WBC      3.40 - 10.80 10*3/mm3 16.12 (H) 17.76 (H)  18.00 (H)   RBC      4.14 - 5.80 10*6/mm3 3.75 (L) 3.92 (L)  3.87 (L)   Hemoglobin      13.0 - 17.7 g/dL 12.3 (L) 12.7 (L)  12.5 (L)   Hematocrit      37.5 - 51.0 % 38.3 37.6  38.6   MCV      79.0 - 97.0 fL 102.1 (H) 95.9  99.7 (H)   MCH      26.6 - 33.0 pg  32.8 32.4  32.3   MCHC      31.5 - 35.7 g/dL 32.1 33.8  32.4   RDW      12.3 - 15.4 % 13.4 13.2  13.0   RDW-SD      37.0 - 54.0 fl 50.3   47.6   MPV      6.0 - 12.0 fL 8.8   9.0   Platelets      140 - 450 10*3/mm3 128 (L) 164  168   Neutrophil Rel %      42.7 - 76.0 % 18.7 (L) CANCELED 36.0 (L) 20.5 (L)   Lymphocyte Rel %      19.6 - 45.3 % 77.5 (H) CANCELED 59.0 (H) 76.6 (H)   Monocyte Rel %      5.0 - 12.0 % 2.4 (L) CANCELED 3.0 (L) 1.8 (L)   Eosinophil Rel %      0.3 - 6.2 % 0.9 CANCELED 2.0 0.6   Basophil Rel %      0.0 - 1.5 % 0.3  0.0 0.3   Immature Granulocyte Rel %      0.0 - 0.5 % 0.2   0.2   Neutrophils Absolute      1.70 - 7.00 10*3/mm3 3.01  6.39 3.69   Lymphocytes Absolute      0.70 - 3.10 10*3/mm3 12.50 (H) CANCELED 10.48 (H) 13.79 (H)   Monocytes Absolute      0.10 - 0.90 10*3/mm3 0.39  0.53 0.33   Eosinophils Absolute      0.00 - 0.40 10*3/mm3 0.14 CANCELED  0.10   Basophils Absolute      0.00 - 0.20 10*3/mm3 0.05 CANCELED 0.00 0.06   Immature Grans, Absolute      0.00 - 0.05 10*3/mm3 0.03   0.03   nRBC      0.0 - 0.2 /100 WBC 0.0   0.0     ASSESSMENT:  1. Chronic lymphocytic leukemia, Koch stage 0 (minimal not palpable splenomegaly, without severe anemia or thrombocytopenia). 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 No B symptom. The patient has no obvious alteration in the laboratory parameters comparing on how the blood counts were in 6/19 having persistent leukocytosis, lymphocytosis, normal hemoglobin, normal platelet count. He has no progressive adenopathy. No progressive splenomegaly. No infections and no autoimmunity.   I discussed with the patient and his wife the fact that the good news is that his chronic lymphoid leukemia is not changing over time and his white count today is no different than what it was 3 years ago. The hemoglobin remains excellent, the platelet count is excellent. The lymphocytosis obeys  to the leukemia and has not changed. He has no peripheral adenopathy, no splenomegaly and no autoimmunity. Therefore the leukemia at this time is quiet and does not require any radiological assessment or any therapeutic intervention.     From the point of view of his Parkinson's I think the lack of access to his neurologist is becoming an issue. The patient has had frequent falls at least 4 in the last 2 months, no consequences so far but he is shuffling his right foot and he is dragging around. He needs a walker, he cannot use a cane anymore. In spite of the walker the falls continue happening because of poor balance. He is doing physical therapy and I asked him to talk with the physical therapy individuals to do a fall assessment and balance assessment and training assessment to see if this makes any difference in the long run. The physical therapist can send a request for further therapy to the patient's neurologist.     I will review him back in a year with a CBC and a CMP.     I DISCUSSED WITH PATIENT IN DETAIL FORMS TO DECREASE CHANCES OF CORONAVIRUS INFECTION INCLUDING ISOLATION, PROPER HAND HIGIENE, AVOID PUBLIC PLACES  WITH CROWDS, FOLLOW  CDC RECOMENDATIONS, AND KEEP PERSONAL AND SOCIAL RESPONSIBILITY, WARE A MASK IN PUBLIC PLACES.  PATIENT IS AWARE THIS INFECTION COULD HAVE SEVERE CONSEQUENCES TO PERSONAL HEALTH AND FAMILY RAMIFICATIONS OF THIS.      .

## 2020-08-19 RX ORDER — AZELASTINE HYDROCHLORIDE 0.5 MG/ML
1 SOLUTION/ DROPS OPHTHALMIC 2 TIMES DAILY
Qty: 6 ML | Refills: 12 | Status: SHIPPED | OUTPATIENT
Start: 2020-08-19 | End: 2021-09-27

## 2020-11-10 RX ORDER — CITALOPRAM 20 MG/1
20 TABLET ORAL DAILY
Qty: 90 TABLET | Refills: 1 | Status: SHIPPED | OUTPATIENT
Start: 2020-11-10 | End: 2021-03-24 | Stop reason: SDUPTHER

## 2020-11-10 NOTE — TELEPHONE ENCOUNTER
----- Message from Sanjuana De La Cruz MD sent at 11/10/2020 10:49 AM EST -----  Regarding: RE: MED REFILL  ok  ----- Message -----  From: Chelsy Butler MA  Sent: 11/10/2020   9:58 AM EST  To: Sanjuana De La Cruz MD  Subject: FW: MED REFILL                                   PLEASE ADVISE  ----- Message -----  From: Josie Acosta  Sent: 11/10/2020   9:41 AM EST  To: Chelsy Butler MA  Subject: MED REFILL                                       Patient received a call from [facility] needing a refill for his citalopram 20mg sent in. He said Dr Sena normally prescribes it but thought Dr De La Cruz could begin to manage it for him. Please contact him to discuss and/or let him know if you will be sending it in. His number is [phone]. Thanks

## 2020-12-15 ENCOUNTER — OFFICE VISIT (OUTPATIENT)
Dept: INTERNAL MEDICINE | Facility: CLINIC | Age: 80
End: 2020-12-15

## 2020-12-15 ENCOUNTER — HOSPITAL ENCOUNTER (OUTPATIENT)
Dept: GENERAL RADIOLOGY | Facility: HOSPITAL | Age: 80
Discharge: HOME | End: 2020-12-15

## 2020-12-15 VITALS
SYSTOLIC BLOOD PRESSURE: 110 MMHG | TEMPERATURE: 96 F | BODY MASS INDEX: 26.69 KG/M2 | DIASTOLIC BLOOD PRESSURE: 70 MMHG | OXYGEN SATURATION: 97 % | HEART RATE: 75 BPM | HEIGHT: 71 IN

## 2020-12-15 DIAGNOSIS — M54.41 ACUTE RIGHT-SIDED LOW BACK PAIN WITH RIGHT-SIDED SCIATICA: ICD-10-CM

## 2020-12-15 DIAGNOSIS — M25.551 RIGHT HIP PAIN: Primary | ICD-10-CM

## 2020-12-15 PROCEDURE — 72100 X-RAY EXAM L-S SPINE 2/3 VWS: CPT

## 2020-12-15 PROCEDURE — 99213 OFFICE O/P EST LOW 20 MIN: CPT | Performed by: INTERNAL MEDICINE

## 2020-12-15 PROCEDURE — 73502 X-RAY EXAM HIP UNI 2-3 VIEWS: CPT

## 2020-12-15 RX ORDER — CYCLOBENZAPRINE HCL 5 MG
5 TABLET ORAL 3 TIMES DAILY PRN
Qty: 30 TABLET | Refills: 0 | Status: SHIPPED | OUTPATIENT
Start: 2020-12-15 | End: 2021-09-27

## 2020-12-15 RX ORDER — LIDOCAINE 50 MG/G
1 PATCH TOPICAL EVERY 24 HOURS
Qty: 30 PATCH | Refills: 0 | Status: SHIPPED | OUTPATIENT
Start: 2020-12-15 | End: 2021-12-21

## 2020-12-15 NOTE — PROGRESS NOTES
Subjective   Driss Ackerman is a 80 y.o. male states he had a fall X 5 days and hurt rt hip ans LB.    History of Present Illness   Pt had a fall lat week  He has been having trouble with this due to parkinsons.  He leaned on the rolling walker and he fell and landed on his right hip and lbp  baldo sx radiating bdown the right leg  He can walk but pain with putting weight on it    The following portions of the patient's history were reviewed and updated as appropriate: allergies, current medications, past medical history, past social history and problem list.    Review of Systems   All other systems reviewed and are negative.      Objective   Physical Exam  Vitals signs reviewed.   Constitutional:       Appearance: He is well-developed.   HENT:      Right Ear: External ear normal.      Left Ear: External ear normal.   Eyes:      Conjunctiva/sclera: Conjunctivae normal.      Pupils: Pupils are equal, round, and reactive to light.   Neck:      Musculoskeletal: Normal range of motion.      Thyroid: No thyromegaly.      Trachea: No tracheal deviation.   Cardiovascular:      Rate and Rhythm: Normal rate and regular rhythm.      Heart sounds: Normal heart sounds.   Pulmonary:      Effort: Pulmonary effort is normal.      Breath sounds: Normal breath sounds.   Abdominal:      General: Bowel sounds are normal. There is no distension.      Palpations: Abdomen is soft.      Tenderness: There is no abdominal tenderness.   Musculoskeletal: Normal range of motion.         General: No deformity.   Neurological:      Mental Status: He is alert and oriented to person, place, and time.   Psychiatric:         Behavior: Behavior normal.         Thought Content: Thought content normal.         Judgment: Judgment normal.         Vitals:    12/15/20 1141   BP: 110/70   Pulse: 75   Temp: 96 °F (35.6 °C)   SpO2: 97%     Current Outpatient Medications:   •  acetaminophen (TYLENOL) 500 MG tablet, Take 500 mg by mouth Every 6 (Six) Hours As  Needed for Mild Pain ., Disp: , Rfl:   •  aspirin 81 MG EC tablet, Take 81 mg by mouth Daily., Disp: , Rfl:   •  azelastine (OPTIVAR) 0.05 % ophthalmic solution, Administer 1 drop to both eyes 2 (Two) Times a Day., Disp: 6 mL, Rfl: 12  •  carbidopa-levodopa (SINEMET)  MG per tablet, Take 3 tablets by mouth. 3 tablet am and 1/2 tablet at bedtime, Disp: , Rfl:   •  CBD (cannabidiol) oral oil, Take  by mouth., Disp: , Rfl:   •  citalopram (CeleXA) 20 MG tablet, Take 1 tablet by mouth Daily., Disp: 90 tablet, Rfl: 1  •  Coenzyme Q10 (CO Q 10) 100 MG capsule, Take 1 tablet by mouth Daily., Disp: , Rfl:   •  Cyanocobalamin (VITAMIN B-12 ER) 2000 MCG tablet controlled-release, Place 1 tablet under the tongue Daily., Disp: , Rfl:   •  Docusate Calcium (STOOL SOFTENER PO), Take 1 tablet by mouth Daily., Disp: , Rfl:   •  fluticasone (FLONASE) 50 MCG/ACT nasal spray, 2 sprays into each nostril Daily., Disp: 3 bottle, Rfl: 1  •  mesalamine (LIALDA) 1.2 G EC tablet, Take 3 tablets by mouth Daily. (Patient taking differently: Take 3.6 g by mouth Daily. Pt takes 4 tabs a day), Disp: 270 tablet, Rfl: 1  •  midodrine (PROAMATINE) 2.5 MG tablet, Take 2.5 mg by mouth 2 (Two) Times a Day., Disp: , Rfl:   •  Multiple Vitamins-Minerals (MULTIVITAMIN PO), Take 1 tablet by mouth daily., Disp: , Rfl:   •  Omega-3 Fatty Acids (FISH OIL) 1000 MG capsule capsule, Take  by mouth daily with breakfast., Disp: , Rfl:   •  cyclobenzaprine (FLEXERIL) 5 MG tablet, Take 1 tablet by mouth 3 (Three) Times a Day As Needed for Muscle Spasms., Disp: 30 tablet, Rfl: 0  •  lidocaine (Lidoderm) 5 %, Place 1 patch on the skin as directed by provider Daily. Remove & Discard patch within 12 hours or as directed by MD, Disp: 30 patch, Rfl: 0    Body mass index is 26.69 kg/m².         Assessment/Plan   Diagnoses and all orders for this visit:    1. Right hip pain (Primary)  -     XR hip w or wo pelvis 2-3 view right  -     XR Spine Lumbar 2 or 3 View    2.  Acute right-sided low back pain with right-sided sciatica  -     XR hip w or wo pelvis 2-3 view right  -     XR Spine Lumbar 2 or 3 View    Other orders  -     lidocaine (Lidoderm) 5 %; Place 1 patch on the skin as directed by provider Daily. Remove & Discard patch within 12 hours or as directed by MD  Dispense: 30 patch; Refill: 0  -     cyclobenzaprine (FLEXERIL) 5 MG tablet; Take 1 tablet by mouth 3 (Three) Times a Day As Needed for Muscle Spasms.  Dispense: 30 tablet; Refill: 0        1.  Right sided lbp and post hip pain  Worse with standing and walkin  Now in a wheela chair toay due to pain  Check XR and go from there    X-ray reviewed: No acute fracture noted.  I discussed this with the family.  We will try Lidoderm patch on the area that he is most tender on the right sided lower back.  He also can use a muscle relaxant but I advised him very much to be cautious with the Flexeril as it can be sedating.  We are going to use a very low dose

## 2021-03-09 DIAGNOSIS — Z23 IMMUNIZATION DUE: ICD-10-CM

## 2021-03-24 ENCOUNTER — OFFICE VISIT (OUTPATIENT)
Dept: INTERNAL MEDICINE | Facility: CLINIC | Age: 81
End: 2021-03-24

## 2021-03-24 VITALS
DIASTOLIC BLOOD PRESSURE: 80 MMHG | OXYGEN SATURATION: 98 % | HEIGHT: 71 IN | SYSTOLIC BLOOD PRESSURE: 142 MMHG | BODY MASS INDEX: 27.72 KG/M2 | WEIGHT: 198 LBS | HEART RATE: 63 BPM

## 2021-03-24 DIAGNOSIS — Z99.89 OSA ON CPAP: ICD-10-CM

## 2021-03-24 DIAGNOSIS — F33.9 EPISODE OF RECURRENT MAJOR DEPRESSIVE DISORDER, UNSPECIFIED DEPRESSION EPISODE SEVERITY (HCC): ICD-10-CM

## 2021-03-24 DIAGNOSIS — G47.33 OSA ON CPAP: ICD-10-CM

## 2021-03-24 DIAGNOSIS — Z00.00 MEDICARE ANNUAL WELLNESS VISIT, SUBSEQUENT: Primary | ICD-10-CM

## 2021-03-24 DIAGNOSIS — R29.6 FALLS FREQUENTLY: ICD-10-CM

## 2021-03-24 DIAGNOSIS — G20 PARKINSON DISEASE (HCC): ICD-10-CM

## 2021-03-24 PROCEDURE — 1125F AMNT PAIN NOTED PAIN PRSNT: CPT | Performed by: INTERNAL MEDICINE

## 2021-03-24 PROCEDURE — 1160F RVW MEDS BY RX/DR IN RCRD: CPT | Performed by: INTERNAL MEDICINE

## 2021-03-24 PROCEDURE — 1170F FXNL STATUS ASSESSED: CPT | Performed by: INTERNAL MEDICINE

## 2021-03-24 PROCEDURE — G0439 PPPS, SUBSEQ VISIT: HCPCS | Performed by: INTERNAL MEDICINE

## 2021-03-24 PROCEDURE — 99213 OFFICE O/P EST LOW 20 MIN: CPT | Performed by: INTERNAL MEDICINE

## 2021-03-24 RX ORDER — CITALOPRAM 40 MG/1
40 TABLET ORAL DAILY
Qty: 90 TABLET | Refills: 1 | Status: SHIPPED | OUTPATIENT
Start: 2021-03-24 | End: 2021-09-27 | Stop reason: SDUPTHER

## 2021-03-24 NOTE — PROGRESS NOTES
Subjective   Driss Ackerman is a 81 y.o. male here today to f/u on parkinson's.  Pt c/o right lower back pain that radiates down the right leg.      History of Present Illness   He is having worsening weakness and more fall  Some right flank  Pain  Weakness in the torso causing him to lean to the right.  Falling almost daily   parkinsons getting worse  He feels like he is getting more depressed    The following portions of the patient's history were reviewed and updated as appropriate: allergies, current medications, past medical history, past social history and problem list.  He is eating more sugar  Less activity    Review of Systems   All other systems reviewed and are negative.      Objective   Physical Exam  Vitals reviewed.   Constitutional:       Appearance: He is well-developed.   HENT:      Head: Normocephalic and atraumatic.      Right Ear: External ear normal.      Left Ear: External ear normal.   Eyes:      Conjunctiva/sclera: Conjunctivae normal.      Pupils: Pupils are equal, round, and reactive to light.   Neck:      Thyroid: No thyromegaly.      Trachea: No tracheal deviation.   Cardiovascular:      Rate and Rhythm: Normal rate and regular rhythm.      Heart sounds: Normal heart sounds.   Pulmonary:      Effort: Pulmonary effort is normal.      Breath sounds: Normal breath sounds.   Abdominal:      General: Bowel sounds are normal. There is no distension.      Palpations: Abdomen is soft.      Tenderness: There is no abdominal tenderness.   Musculoskeletal:         General: No deformity. Normal range of motion.      Cervical back: Normal range of motion.   Skin:     General: Skin is warm and dry.   Neurological:      Mental Status: He is alert and oriented to person, place, and time.   Psychiatric:         Behavior: Behavior normal.         Thought Content: Thought content normal.         Judgment: Judgment normal.         Vitals:    03/24/21 0922   BP: 142/80   Pulse: 63   SpO2: 98%     Body mass  index is 27.72 kg/m².       Current Outpatient Medications   Medication Instructions   • acetaminophen (TYLENOL) 500 mg, Oral, Every 6 Hours PRN   • aspirin 81 mg, Oral, Daily   • azelastine (OPTIVAR) 0.05 % ophthalmic solution 1 drop, Both Eyes, 2 Times Daily   • carbidopa-levodopa (SINEMET)  MG per tablet 3 tablets, Oral, 3 tablet am and 1/2 tablet at bedtime   • CBD (cannabidiol) oral oil Oral   • citalopram (CELEXA) 40 mg, Oral, Daily   • Coenzyme Q10 (CO Q 10) 100 MG capsule 1 tablet, Oral, Daily   • Cyanocobalamin (VITAMIN B-12 ER) 2000 MCG tablet controlled-release 1 tablet, Sublingual, Daily   • cyclobenzaprine (FLEXERIL) 5 mg, Oral, 3 Times Daily PRN   • Docusate Calcium (STOOL SOFTENER PO) 1 tablet, Oral, Daily   • fluticasone (FLONASE) 50 MCG/ACT nasal spray 2 sprays, Nasal, Daily   • lidocaine (Lidoderm) 5 % 1 patch, Transdermal, Every 24 Hours, Remove & Discard patch within 12 hours or as directed by MD   • mesalamine (LIALDA) 3.6 g, Oral, Daily   • midodrine (PROAMATINE) 2.5 mg, Oral, 2 Times Daily   • Omega-3 Fatty Acids (FISH OIL) 1000 MG capsule capsule Oral, Daily With Breakfast         Assessment/Plan   Diagnoses and all orders for this visit:    1. Medicare annual wellness visit, subsequent (Primary)    2. Parkinson disease (CMS/[facility])    3. PAVEL on CPAP    4. Falls frequently    5. Episode of recurrent major depressive disorder, unspecified depression episode severity (CMS/[facility])    Other orders  -     citalopram (CeleXA) 40 MG tablet; Take 1 tablet by mouth Daily.  Dispense: 90 tablet; Refill: 1    1. Parkinson-patient is a significant fall risk and is having almost daily falls.  This is due to the Parkinson's and weakness.  He would definitely benefit from physical therapy   2. Weakness/falls-related to deconditioning as well as Parkinson's.  He needs a home health assessment.  His wife cannot even get him into the car anymore  3. PAVEL- stable with cpap  4.  Depression - increase the  fernya

## 2021-03-24 NOTE — PROGRESS NOTES
The ABCs of the Annual Wellness Visit  Subsequent Medicare Wellness Visit    Chief Complaint   Patient presents with   • Medicare Wellness-subsequent       Subjective   History of Present Illness:  Driss Ackerman is a 81 y.o. male who presents for a Subsequent Medicare Wellness Visit.    HEALTH RISK ASSESSMENT    Recent Hospitalizations:  No hospitalization(s) within the last year.    Current Medical Providers:  Patient Care Team:  Sanjuana De La Cruz MD as PCP - General  Sanjuana De La Cruz MD as Referring Physician (Internal Medicine)  Alvin Jackman MD as Consulting Physician (Hematology and Oncology)    Smoking Status:  Social History     Tobacco Use   Smoking Status Never Smoker   Smokeless Tobacco Never Used       Alcohol Consumption:  Social History     Substance and Sexual Activity   Alcohol Use No       Depression Screen:   PHQ-2/PHQ-9 Depression Screening 3/24/2021   Little interest or pleasure in doing things 0   Feeling down, depressed, or hopeless 3   Trouble falling or staying asleep, or sleeping too much 0   Feeling tired or having little energy 3   Poor appetite or overeating 0   Feeling bad about yourself - or that you are a failure or have let yourself or your family down 0   Trouble concentrating on things, such as reading the newspaper or watching television 2   Moving or speaking so slowly that other people could have noticed. Or the opposite - being so fidgety or restless that you have been moving around a lot more than usual 3   Thoughts that you would be better off dead, or of hurting yourself in some way 1   Total Score 12   If you checked off any problems, how difficult have these problems made it for you to do your work, take care of things at home, or get along with other people? Not difficult at all       Fall Risk Screen:  STEADI Fall Risk Assessment was completed, and patient is at HIGH risk for falls. Assessment completed on:3/24/2021    Health Habits and Functional and Cognitive  Screening:  Functional & Cognitive Status 3/24/2021   Do you have difficulty preparing food and eating? Yes   Do you have difficulty bathing yourself, getting dressed or grooming yourself? Yes   Do you have difficulty using the toilet? No   Do you have difficulty moving around from place to place? Yes   Do you have trouble with steps or getting out of a bed or a chair? Yes   Current Diet Limited Junk Food        Current Diet Comment -   Dental Exam Up to date   Eye Exam Not up to date   Exercise (times per week) 6 times per week   Current Exercises Include (No Data)        Exercise Comment stretching exercises   Current Exercise Activities Include -   Do you need help using the phone?  No   Are you deaf or do you have serious difficulty hearing?  Yes   Do you need help with transportation? Yes   Do you need help shopping? Yes   Do you need help preparing meals?  Yes   Do you need help with housework?  Yes   Do you need help with laundry? Yes   Do you need help taking your medications? No   Do you need help managing money? Yes   Do you ever drive or ride in a car without wearing a seat belt? No   Have you felt unusual stress, anger or loneliness in the last month? No   Who do you live with? Spouse   If you need help, do you have trouble finding someone available to you? No   Have you been bothered in the last four weeks by sexual problems? No   Do you have difficulty concentrating, remembering or making decisions? Yes         Does the patient have evidence of cognitive impairment? Yes occas forgets things    Asprin use counseling:Taking ASA appropriately as indicated    Age-appropriate Screening Schedule:  Refer to the list below for future screening recommendations based on patient's age, sex and/or medical conditions. Orders for these recommended tests are listed in the plan section. The patient has been provided with a written plan.    Health Maintenance   Topic Date Due   • TDAP/TD VACCINES (1 - Tdap) Never done    • ZOSTER VACCINE (2 of 3) 05/06/2011   • INFLUENZA VACCINE  03/24/2022 (Originally 8/1/2020)   • LIPID PANEL  06/29/2021          The following portions of the patient's history were reviewed and updated as appropriate: allergies, current medications, past medical history, past social history and problem list.    Outpatient Medications Prior to Visit   Medication Sig Dispense Refill   • acetaminophen (TYLENOL) 500 MG tablet Take 500 mg by mouth Every 6 (Six) Hours As Needed for Mild Pain .     • aspirin 81 MG EC tablet Take 81 mg by mouth Daily.     • azelastine (OPTIVAR) 0.05 % ophthalmic solution Administer 1 drop to both eyes 2 (Two) Times a Day. 6 mL 12   • carbidopa-levodopa (SINEMET)  MG per tablet Take 3 tablets by mouth. 3 tablet am and 1/2 tablet at bedtime     • CBD (cannabidiol) oral oil Take  by mouth.     • Coenzyme Q10 (CO Q 10) 100 MG capsule Take 1 tablet by mouth Daily.     • Cyanocobalamin (VITAMIN B-12 ER) 2000 MCG tablet controlled-release Place 1 tablet under the tongue Daily.     • Docusate Calcium (STOOL SOFTENER PO) Take 1 tablet by mouth Daily.     • fluticasone (FLONASE) 50 MCG/ACT nasal spray 2 sprays into each nostril Daily. 3 bottle 1   • lidocaine (Lidoderm) 5 % Place 1 patch on the skin as directed by provider Daily. Remove & Discard patch within 12 hours or as directed by MD 30 patch 0   • mesalamine (LIALDA) 1.2 G EC tablet Take 3 tablets by mouth Daily. (Patient taking differently: Take 3.6 g by mouth Daily. Pt takes 4 tabs a day) 270 tablet 1   • midodrine (PROAMATINE) 2.5 MG tablet Take 2.5 mg by mouth 2 (Two) Times a Day.     • Omega-3 Fatty Acids (FISH OIL) 1000 MG capsule capsule Take  by mouth daily with breakfast.     • citalopram (CeleXA) 20 MG tablet Take 1 tablet by mouth Daily. 90 tablet 1   • cyclobenzaprine (FLEXERIL) 5 MG tablet Take 1 tablet by mouth 3 (Three) Times a Day As Needed for Muscle Spasms. 30 tablet 0   • Multiple Vitamins-Minerals (MULTIVITAMIN PO)  "Take 1 tablet by mouth daily.       No facility-administered medications prior to visit.       Patient Active Problem List   Diagnosis   • Chronic coronary artery disease   • Arthritis   • Atrial fibrillation (CMS/HCC)   • Benign prostatic hyperplasia with urinary obstruction   • Fatigue   • Hypercholesterolemia   • Hypogonadism in male   • Memory impairment   • Ulcerative colitis (CMS/HCC)   • Vitamin D deficiency   • Atopic rhinitis   • Benign essential hypertension   • Chronic lymphocytic leukemia (CMS/HCC)   • Thrombocytopenia (CMS/HCC)   • Ulcerative proctitis with complication (CMS/HCC)   • Parkinson disease (CMS/HCC)   • PAVEL on CPAP   • Apathy   • History of colonic polyps   • Sialorrhea   • Dysarthria   • Fall       Advanced Care Planning:  ACP discussion was held with the patient during this visit. Patient has an advance directive in EMR which is still valid.     Review of Systems    Compared to one year ago, the patient feels his physical health is worse.  Compared to one year ago, the patient feels his mental health is worse.    Reviewed chart for potential of high risk medication in the elderly: yes  Reviewed chart for potential of harmful drug interactions in the elderly:yes    Objective         Vitals:    03/24/21 0922   BP: 142/80   BP Location: Left arm   Patient Position: Sitting   Pulse: 63   SpO2: 98%   Weight: 89.8 kg (198 lb)   Height: 180 cm (70.87\")   PainSc:   3       Body mass index is 27.72 kg/m².  Discussed the patient's BMI with him. The BMI is above average; BMI management plan is completed.    Physical Exam          Assessment/Plan   Medicare Risks and Personalized Health Plan  CMS Preventative Services Quick Reference  Fall Risk  Immunizations Discussed/Encouraged (specific immunizations; Shingrix )    The above risks/problems have been discussed with the patient.  Pertinent information has been shared with the patient in the After Visit Summary.  Follow up plans and orders are seen " below in the Assessment/Plan Section.    Diagnoses and all orders for this visit:    1. Medicare annual wellness visit, subsequent (Primary)    2. Parkinson disease (CMS/[facility])    3. PAVEL on CPAP    4. Falls frequently    5. Episode of recurrent major depressive disorder, unspecified depression episode severity (CMS/[facility])    Other orders  -     citalopram (CeleXA) 40 MG tablet; Take 1 tablet by mouth Daily.  Dispense: 90 tablet; Refill: 1      Follow Up:  No follow-ups on file.     An After Visit Summary and PPPS were given to the patient.

## 2021-03-29 ENCOUNTER — TELEPHONE (OUTPATIENT)
Dept: INTERNAL MEDICINE | Facility: CLINIC | Age: 81
End: 2021-03-29

## 2021-03-29 NOTE — TELEPHONE ENCOUNTER
Caller: ENRIQUE    Relationship to patient:     Best call back number: [phone]    Patient is needing: ENRIQUE W[facility] [employer] CALLING IN REGARDS TO WANTING TO LET ANTHONY KNOW SHE RECEIVED PAPER WORK ON PATIENT FOR PHYSICAL THERAPY

## 2021-04-01 ENCOUNTER — TELEPHONE (OUTPATIENT)
Dept: INTERNAL MEDICINE | Facility: CLINIC | Age: 81
End: 2021-04-01

## 2021-04-01 NOTE — TELEPHONE ENCOUNTER
EVERETT WITH [employer] RETURNING CALL     NEEDS VERBAL ORDERS TO IMPLEMENT PHYSICAL THERAPY    2 TIMES FOR 4 WEEKS AND 1 TIME FOR 2 WEEKS.    CALL BACK NUMBER [phone]  WANTS TO START MONDAY

## 2021-04-01 NOTE — TELEPHONE ENCOUNTER
----- Message from Josie Acosta sent at 4/1/2021  8:40 AM EDT -----  Regarding: VERBAL ORDERS  Marlene Che needs verbal orders for this patient. Please call her back at [phone]. Thanks

## 2021-04-20 ENCOUNTER — TELEPHONE (OUTPATIENT)
Dept: INTERNAL MEDICINE | Facility: CLINIC | Age: 81
End: 2021-04-20

## 2021-04-20 NOTE — TELEPHONE ENCOUNTER
MODESTO FROM [employer] IS CALLING IN SHE WANTED TO LET DOCTOR KNOW THAT PATIENT HAS HAD A FEW FALLS LATELY.

## 2021-08-05 ENCOUNTER — APPOINTMENT (OUTPATIENT)
Dept: LAB | Facility: HOSPITAL | Age: 81
End: 2021-08-05

## 2021-08-18 ENCOUNTER — OFFICE VISIT (OUTPATIENT)
Dept: ONCOLOGY | Facility: CLINIC | Age: 81
End: 2021-08-18

## 2021-08-18 ENCOUNTER — LAB (OUTPATIENT)
Dept: LAB | Facility: HOSPITAL | Age: 81
End: 2021-08-18

## 2021-08-18 VITALS
HEART RATE: 55 BPM | DIASTOLIC BLOOD PRESSURE: 73 MMHG | WEIGHT: 199.4 LBS | HEIGHT: 71 IN | BODY MASS INDEX: 27.92 KG/M2 | OXYGEN SATURATION: 93 % | RESPIRATION RATE: 16 BRPM | SYSTOLIC BLOOD PRESSURE: 129 MMHG | TEMPERATURE: 96.9 F

## 2021-08-18 DIAGNOSIS — C91.10 CHRONIC LYMPHOCYTIC LEUKEMIA (HCC): Primary | ICD-10-CM

## 2021-08-18 DIAGNOSIS — C44.602 SKIN CANCER OF ARM, RIGHT: ICD-10-CM

## 2021-08-18 LAB
ALBUMIN SERPL-MCNC: 4.6 G/DL (ref 3.5–5.2)
ALBUMIN/GLOB SERPL: 2.4 G/DL (ref 1.1–2.4)
ALP SERPL-CCNC: 75 U/L (ref 38–116)
ALT SERPL W P-5'-P-CCNC: <5 U/L (ref 0–41)
ANION GAP SERPL CALCULATED.3IONS-SCNC: 9.8 MMOL/L (ref 5–15)
AST SERPL-CCNC: 11 U/L (ref 0–40)
BASOPHILS # BLD AUTO: 0.05 10*3/MM3 (ref 0–0.2)
BASOPHILS NFR BLD AUTO: 0.3 % (ref 0–1.5)
BILIRUB SERPL-MCNC: 0.5 MG/DL (ref 0.2–1.2)
BUN SERPL-MCNC: 14 MG/DL (ref 6–20)
BUN/CREAT SERPL: 25.9 (ref 7.3–30)
CALCIUM SPEC-SCNC: 9.4 MG/DL (ref 8.5–10.2)
CHLORIDE SERPL-SCNC: 103 MMOL/L (ref 98–107)
CO2 SERPL-SCNC: 28.2 MMOL/L (ref 22–29)
CREAT SERPL-MCNC: 0.54 MG/DL (ref 0.7–1.3)
DEPRECATED RDW RBC AUTO: 47.6 FL (ref 37–54)
EOSINOPHIL # BLD AUTO: 0.13 10*3/MM3 (ref 0–0.4)
EOSINOPHIL NFR BLD AUTO: 0.7 % (ref 0.3–6.2)
ERYTHROCYTE [DISTWIDTH] IN BLOOD BY AUTOMATED COUNT: 13.3 % (ref 12.3–15.4)
GFR SERPL CREATININE-BSD FRML MDRD: 146 ML/MIN/1.73
GLOBULIN UR ELPH-MCNC: 1.9 GM/DL (ref 1.8–3.5)
GLUCOSE SERPL-MCNC: 104 MG/DL (ref 74–124)
HCT VFR BLD AUTO: 37.7 % (ref 37.5–51)
HGB BLD-MCNC: 12.3 G/DL (ref 13–17.7)
IMM GRANULOCYTES # BLD AUTO: 0.04 10*3/MM3 (ref 0–0.05)
IMM GRANULOCYTES NFR BLD AUTO: 0.2 % (ref 0–0.5)
LYMPHOCYTES # BLD AUTO: 13.41 10*3/MM3 (ref 0.7–3.1)
LYMPHOCYTES NFR BLD AUTO: 76 % (ref 19.6–45.3)
MCH RBC QN AUTO: 31.8 PG (ref 26.6–33)
MCHC RBC AUTO-ENTMCNC: 32.6 G/DL (ref 31.5–35.7)
MCV RBC AUTO: 97.4 FL (ref 79–97)
MONOCYTES # BLD AUTO: 0.35 10*3/MM3 (ref 0.1–0.9)
MONOCYTES NFR BLD AUTO: 2 % (ref 5–12)
NEUTROPHILS NFR BLD AUTO: 20.8 % (ref 42.7–76)
NEUTROPHILS NFR BLD AUTO: 3.66 10*3/MM3 (ref 1.7–7)
NRBC BLD AUTO-RTO: 0 /100 WBC (ref 0–0.2)
PLATELET # BLD AUTO: 182 10*3/MM3 (ref 140–450)
PMV BLD AUTO: 9.2 FL (ref 6–12)
POTASSIUM SERPL-SCNC: 4.1 MMOL/L (ref 3.5–4.7)
PROT SERPL-MCNC: 6.5 G/DL (ref 6.3–8)
RBC # BLD AUTO: 3.87 10*6/MM3 (ref 4.14–5.8)
SODIUM SERPL-SCNC: 141 MMOL/L (ref 134–145)
WBC # BLD AUTO: 17.64 10*3/MM3 (ref 3.4–10.8)

## 2021-08-18 PROCEDURE — 99214 OFFICE O/P EST MOD 30 MIN: CPT | Performed by: INTERNAL MEDICINE

## 2021-08-18 PROCEDURE — 80053 COMPREHEN METABOLIC PANEL: CPT

## 2021-08-18 PROCEDURE — 85025 COMPLETE CBC W/AUTO DIFF WBC: CPT

## 2021-08-18 PROCEDURE — 36415 COLL VENOUS BLD VENIPUNCTURE: CPT

## 2021-08-18 NOTE — PROGRESS NOTES
REASON FOR FOLLOWUP: CHRONIC LYMPHOCYTIC LEUKEMIA. PATEL STAGE 0     HISTORY OF PRESENT ILLNESS:  DURING THE VISIT WITH THE PATIENT TODAY , PATIENT HAD FACE MASK, I HAD PROPPER PROTECTIVE EQUIPMENT, AND I DID HAND HYGIENE WITH SOAP AND WATER BEFORE AND AFTER THE VISIT.    This patient returns today to the office with his wife who has been waiting for him down the stairs. In the interim of time the patient has had still some falls here and there but not as bad as before and he is using a wheelchair now days most of the time. He has minimal tremor associated with Parkinson’s but he says that his memory is not as good as it used to be. He has developed some lesions in the skin of the right forearm that he wants for me to review. His appetite is acceptable. Weight is stable. His bowel activity and urination with occasional incontinence and he is wearing a diaper most of the time. He has not had any cardiovascular or respiratory issues. He has continued doing physical therapy outpatient twice a week. His wife takes him for that.              Past Medical History:   Diagnosis Date   • Actinic keratosis    • Allergic rhinitis    • Anemia    • Anxiety    • Arteriosclerosis of carotid artery    • Arthritis    • Atrial fibrillation (CMS/HCC)     ABLATION   • Atrial flutter (CMS/HCC)    • CAD (coronary artery disease)    • Cardiomyopathy (CMS/HCC)    • Constipation    • Depression    • Eczema    • Enlarged prostate    • Fatigue    • H/O heart artery stent     X5   • Hearing loss    • HTN (hypertension)    • Hypercholesteremia    • Hypogonadism male    • Leukocytosis    • Memory change    • Mitral regurgitation    • Myalgia    • Osteoarthritis     ANKLE, FOOT, RIGHT   • Parkinson's disease (CMS/HCC)    • Prostatitis    • Pulmonic regurgitation    • Right ankle pain    • Skin cancer    • Sleep apnea     CPAP   • Ulcerative colitis (CMS/HCC)      Social History     Socioeconomic History   • Marital status:      Spouse name:  Trina Ackerman   • Number of children: 6   • Years of education: Not on file   • Highest education level: Not on file   Tobacco Use   • Smoking status: Never Smoker   • Smokeless tobacco: Never Used   Substance and Sexual Activity   • Alcohol use: No   • Drug use: No     Family History   Problem Relation Age of Onset   • Heart disease Mother    • Hypertension Mother    • Heart disease Father    • Heart failure Father                  HEMATOLOGY/ONCOLOGY HISTORY:   Patient was initially seen on 3/24/2016 for evaluation leukocytosis. The patient previously was seen by Dr. Landon Phillips and since Dr. Phillips left town recently, he has been seeing Dr. De La Cruz. This patient was seen by orthopedic surgeon, Dr. Gonzalez, for planned right ankle surgical procedure. However, because of elevated WBC patient is referred to us for evaluation and his surgery has been on hold.      Patient reports no fever, sweating, chills and especially no night sweats. He has no significant weight losses (he did have some weight loss after the surgery but has regained). The patient otherwise has no B symptoms or other specific complaints.      According to the patient, he was told having elevated WBC counts in October of 2015 when he had right knee replacement. Per computer records, the last time he had normal WBC was back in July of 2013 when he had laboratory study at Dr. Huntley’s office on 07/16/2013. The study reported a total WBC of 5200 including neutrophils 2600 and lymphocytes 2300. His hemoglobin was 14.7 and platelets 173,000. MCV was 102. His chemistry lab reported normal creatinine of 0.71, normal electrolytes and normal liver function panel. His TSH was also normal at 1.28 and vitamin D at 34.8 ng/mL. There were no CBC results available for review until 10/06/2015 when he had total WBC of 12,800 including neutrophils 2900 and lymphocytes 9600. His hemoglobin was 12.4, MCV 91, platelets 171,000. At that time patient had right knee  replacement. Chemistry lab showed normal TSH of 1.8 and vitamin D at 29.6 ng/mL.     On April 16 of 2015, laboratory study on the day of surgery reported a total WBC of 13,300 including neutrophils 2400, lymphocytes 10,400, hemoglobin 13.5 and platelets 149,000. MCV was 95. Postop hemoglobin was 10.5 on April 21 2015.      On 3/24/16, the day of his initial oncology evaluation, his total WBC was 10,960, ANC 3000 and lymphocyte 7,210, Platelet 147,000 and hemoglobin 12.3 g/dl. , normal CMP. Serum iron 150, TIBC 321, iron saturation 47, ferritin 60.5 ng/ml, folate 16.6 ng/ml, B12 at 625 pg/ml.      Peripheral blood Flowcytometry study on 3/24/16 reported chronic lymphocytic leukemia. Negative for CD38 and ZAP70 by flow. The CLL panel reported positive for 55.5% cells deletion of chrom 13q14 DLEU1/DLEU2, negative for all the others.      CT scan on 3/29/2016 reported the neck and chest are largely unremarkable. No mass or adenopathy is present at these levels. The spleen is enlarged 16 cm at the craniocaudal extent. There are some mildly prominent lymph nodes in the bilateral inguinal regions with the largest node at the right inguinal level measuring up to 2.7 cm long axis.  . No further adenopathy is identified within the abdomen or pelvis.      Allergies   Allergen Reactions   • Penicillins Rash     Current Outpatient Medications on File Prior to Visit   Medication Sig Dispense Refill   • acetaminophen (TYLENOL) 500 MG tablet Take 500 mg by mouth Every 6 (Six) Hours As Needed for Mild Pain .     • aspirin 81 MG EC tablet Take 81 mg by mouth Daily.     • azelastine (OPTIVAR) 0.05 % ophthalmic solution Administer 1 drop to both eyes 2 (Two) Times a Day. 6 mL 12   • carbidopa-levodopa (SINEMET)  MG per tablet Take 3 tablets by mouth. 3 tablet am and 1/2 tablet at bedtime     • CBD (cannabidiol) oral oil Take  by mouth.     • citalopram (CeleXA) 40 MG tablet Take 1 tablet by mouth Daily. 90 tablet 1   •  "Coenzyme Q10 (CO Q 10) 100 MG capsule Take 1 tablet by mouth Daily.     • Cyanocobalamin (VITAMIN B-12 ER) 2000 MCG tablet controlled-release Place 1 tablet under the tongue Daily.     • cyclobenzaprine (FLEXERIL) 5 MG tablet Take 1 tablet by mouth 3 (Three) Times a Day As Needed for Muscle Spasms. 30 tablet 0   • Docusate Calcium (STOOL SOFTENER PO) Take 1 tablet by mouth Daily.     • fluticasone (FLONASE) 50 MCG/ACT nasal spray 2 sprays into each nostril Daily. 3 bottle 1   • lidocaine (Lidoderm) 5 % Place 1 patch on the skin as directed by provider Daily. Remove & Discard patch within 12 hours or as directed by MD 30 patch 0   • mesalamine (LIALDA) 1.2 G EC tablet Take 3 tablets by mouth Daily. (Patient taking differently: Take 3.6 g by mouth Daily. Pt takes 4 tabs a day) 270 tablet 1   • midodrine (PROAMATINE) 2.5 MG tablet Take 2.5 mg by mouth 2 (Two) Times a Day.     • Omega-3 Fatty Acids (FISH OIL) 1000 MG capsule capsule Take  by mouth daily with breakfast.       No current facility-administered medications on file prior to visit.            Vitals:    08/18/21 1401   BP: 129/73   Pulse: 55   Resp: 16   Temp: 96.9 °F (36.1 °C)   TempSrc: Skin   SpO2: 93%   Weight: 90.4 kg (199 lb 6.4 oz)   Height: 180 cm (70.87\")   PainSc: 0-No pain     Physical Exam     Patient screened  for depression based on a PHQ-9 score of 12 on 3/24/2021.     I HAVE PERSONALLY REVIEWED THE HISTORY OF THE PRESENT ILLNESS, PAST MEDICAL HISTORY, FAMILY HISTORY, SOCIAL HISTORY, ALLERGIES, MEDICATIONS STATED ABOVE IN THE  NOTE FROM TODAY.        GENERAL:  Well-developed, well-nourished  Patient  in no acute distress. IN A WHEEL CHAIR  SKIN:  Warm, dry ,NO rashes,NO purpura ,NO petechiae.ACTINIC KERATOSIS X 3 R FOREARM ONE SKIN CANCER  HEENT:  Pupils were equal and reactive to light and accomodation, conjunctivae noninjected, no pterygium, normal extraocular movements, normal visual acuity.   NECK:  Supple with good range of motion; no " thyromegaly , no other masses, no JVD or bruits, no cervical adenopathies.No carotid artery pain, no carotid abnormal pulsation , NO arterial dance.  LYMPHATICS:  No cervical, NO supraclavicular, NO axillary,NO epitrochlear , NO inguinal adenopathy.  CARDIAC   normal rate and regular rhythm, without murmur,NO rubs NO S3 NO S4 right or left .  LUNGS: normal breath sounds bilateral, no wheezing, rhonchi, crackles or rubs.  VASCULAR VENOUS: no cyanosis, collateral circulation, varicosities, edema, palpable cords, pain, erythema.  ABDOMEN:  Soft, nontender with no hepatomegaly, no splenomegaly,no masses, no ascites, no collateral circulation,no distention,no Ruston sign.  EXTREMITIES  AND SPINE:  No clubbing, cyanosis or edema, HANDS OA deformities , no pain .No kyphosis, scoliosis, no other deformities, no pain in spine, no pain in ribs , no pain inpelvic bone.  NEUROLOGICAL:  Patient was awake, alert, oriented to time, person and place.MINIMAL HAND TREMOR PROPPER VOICE       DIAGNOSTIC      Lab Results   Component Value Date    WBC 17.64 (H) 08/18/2021    HGB 12.3 (L) 08/18/2021    HCT 37.7 08/18/2021    MCV 97.4 (H) 08/18/2021     08/18/2021     Lab Results   Component Value Date    NEUTROABS 3.66 08/18/2021         lymph  11,250     Lab Results   Component Value Date    GLUCOSE 126 (H) 08/06/2020    BUN 14 03/24/2021    CREATININE 0.62 (L) 03/24/2021    EGFRIFNONA 125 03/24/2021    EGFRIFAFRI >150 03/24/2021    BCR 22.6 03/24/2021    CO2 27.8 03/24/2021    CALCIUM 9.3 03/24/2021    PROTENTOTREF 6.1 03/24/2021    ALBUMIN 4.40 03/24/2021    LABIL2 2.6 03/24/2021    AST 8 03/24/2021    ALT 5 03/24/2021         ASSESSMENT:  1. Chronic lymphocytic leukemia, Koch stage 0 (minimal not palpable splenomegaly, without severe anemia or thrombocytopenia). 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 No B symptom. The patient has no obvious  alteration in the laboratory parameters comparing on how the blood counts were in 6/19 having persistent leukocytosis, lymphocytosis, normal hemoglobin, normal platelet count. He has no progressive adenopathy. No progressive splenomegaly. No infections and no autoimmunity.   I discussed with the patient and his wife the fact that the good news is that his chronic lymphoid leukemia is not changing over time and his white count today is no different than what it was 3 years ago. The hemoglobin remains excellent, the platelet count is excellent. The lymphocytosis obeys to the leukemia and has not changed. He has no peripheral adenopathy, no splenomegaly and no autoimmunity. Therefore the leukemia at this time is quiet and does not require any radiological assessment or any therapeutic intervention.     The patient was further reviewed on 08/18/2021. His physical exam in regard to leukemia has not changed overall with no peripheral adenopathy, hepatosplenomegaly. No B symptoms, no autoimmunity and no infection. His white count today is 17,000, no different than what it was 4 years ago. The hemoglobin and platelet count remain stable. The total lymphocyte count is about the same. Overall his CLL is very quiet. I find no need for intervention. I find no need for radiological assessment or any other laboratory testing on him at this point.     2. The patient has developed lesions in the forearm on the right side that are suggestive of actinic keratoses and maybe one lesion is already a skin cancer. He has been referred to be seen by his dermatologist, Dr. Sahu.     I pointed out to him that skin cancer is more common in patients who have lymphoid malignancies.      I discussed all these facts with the patient in the room and his wife on the telephone.     I will review him back in a year with a CBC and a CMP.

## 2021-09-20 DIAGNOSIS — I10 BENIGN ESSENTIAL HYPERTENSION: ICD-10-CM

## 2021-09-20 DIAGNOSIS — E78.5 HYPERLIPIDEMIA, UNSPECIFIED HYPERLIPIDEMIA TYPE: Primary | ICD-10-CM

## 2021-09-20 DIAGNOSIS — E55.9 VITAMIN D DEFICIENCY: ICD-10-CM

## 2021-09-27 ENCOUNTER — OFFICE VISIT (OUTPATIENT)
Dept: INTERNAL MEDICINE | Facility: CLINIC | Age: 81
End: 2021-09-27

## 2021-09-27 VITALS
DIASTOLIC BLOOD PRESSURE: 72 MMHG | TEMPERATURE: 97.5 F | OXYGEN SATURATION: 97 % | HEART RATE: 62 BPM | WEIGHT: 204.8 LBS | BODY MASS INDEX: 28.67 KG/M2 | SYSTOLIC BLOOD PRESSURE: 124 MMHG | HEIGHT: 71 IN

## 2021-09-27 DIAGNOSIS — I10 BENIGN ESSENTIAL HYPERTENSION: Primary | ICD-10-CM

## 2021-09-27 DIAGNOSIS — F32.A DEPRESSION, UNSPECIFIED DEPRESSION TYPE: ICD-10-CM

## 2021-09-27 PROCEDURE — 99213 OFFICE O/P EST LOW 20 MIN: CPT | Performed by: INTERNAL MEDICINE

## 2021-09-27 RX ORDER — CITALOPRAM 40 MG/1
40 TABLET ORAL DAILY
Qty: 90 TABLET | Refills: 3 | Status: SHIPPED | OUTPATIENT
Start: 2021-09-27 | End: 2022-10-04 | Stop reason: SDUPTHER

## 2021-09-27 NOTE — PROGRESS NOTES
Subjective   Driss Ackerman is a 81 y.o. male here today to f/u on depression.  Pt fell x 2 weeks right side bruised.      History of Present Illness   He has been doing PT on zoomfor the parkinsons  Ok with celexa for the depression    The following portions of the patient's history were reviewed and updated as appropriate: allergies, current medications, past medical history, past social history and problem list.    Review of Systems    Objective   Physical Exam  Vitals reviewed.   Constitutional:       Appearance: He is well-developed.   HENT:      Head: Normocephalic and atraumatic.      Right Ear: External ear normal.      Left Ear: External ear normal.   Eyes:      Conjunctiva/sclera: Conjunctivae normal.      Pupils: Pupils are equal, round, and reactive to light.   Neck:      Thyroid: No thyromegaly.      Trachea: No tracheal deviation.   Cardiovascular:      Rate and Rhythm: Normal rate and regular rhythm.      Heart sounds: Normal heart sounds.   Pulmonary:      Effort: Pulmonary effort is normal.      Breath sounds: Normal breath sounds.   Abdominal:      General: Bowel sounds are normal. There is no distension.      Palpations: Abdomen is soft.      Tenderness: There is no abdominal tenderness.   Musculoskeletal:         General: No deformity. Normal range of motion.      Cervical back: Normal range of motion.   Skin:     General: Skin is warm and dry.   Neurological:      Mental Status: He is alert and oriented to person, place, and time.   Psychiatric:         Behavior: Behavior normal.         Thought Content: Thought content normal.         Judgment: Judgment normal.         Vitals:    09/27/21 1144   BP: 124/72   Pulse: 62   Temp: 97.5 °F (36.4 °C)   SpO2: 97%     Body mass index is 28.67 kg/m².       Current Outpatient Medications:   •  acetaminophen (TYLENOL) 500 MG tablet, Take 500 mg by mouth Every 6 (Six) Hours As Needed for Mild Pain ., Disp: , Rfl:   •  aspirin 81 MG EC tablet, Take 81 mg  by mouth Daily., Disp: , Rfl:   •  carbidopa-levodopa (SINEMET)  MG per tablet, Take 3 tablets by mouth. 1 tablet with each meal and one at bedtime, Disp: , Rfl:   •  CBD (cannabidiol) oral oil, Take  by mouth., Disp: , Rfl:   •  citalopram (CeleXA) 40 MG tablet, Take 1 tablet by mouth Daily., Disp: 90 tablet, Rfl: 1  •  Coenzyme Q10 (CO Q 10) 100 MG capsule, Take 1 tablet by mouth Daily., Disp: , Rfl:   •  Cyanocobalamin (VITAMIN B-12 ER) 2000 MCG tablet controlled-release, Place 1 tablet under the tongue Daily., Disp: , Rfl:   •  Docusate Calcium (STOOL SOFTENER PO), Take 1 tablet by mouth Daily., Disp: , Rfl:   •  fluticasone (FLONASE) 50 MCG/ACT nasal spray, 2 sprays into each nostril Daily., Disp: 3 bottle, Rfl: 1  •  mesalamine (LIALDA) 1.2 G EC tablet, Take 3 tablets by mouth Daily. (Patient taking differently: Take 3.6 g by mouth Daily. Pt takes 4 tabs a day), Disp: 270 tablet, Rfl: 1  •  midodrine (PROAMATINE) 2.5 MG tablet, Take 2.5 mg by mouth 2 (Two) Times a Day., Disp: , Rfl:   •  Omega-3 Fatty Acids (FISH OIL) 1000 MG capsule capsule, Take  by mouth daily with breakfast., Disp: , Rfl:   •  lidocaine (Lidoderm) 5 %, Place 1 patch on the skin as directed by provider Daily. Remove & Discard patch within 12 hours or as directed by MD, Disp: 30 patch, Rfl: 0      Assessment/Plan   Diagnoses and all orders for this visit:    1. Benign essential hypertension (Primary)    2. Depression, unspecified depression type      1.  Depression- ok with celexa  2.  HTN-  Ok with current meds

## 2021-11-07 ENCOUNTER — READMISSION MANAGEMENT (OUTPATIENT)
Dept: CALL CENTER | Facility: HOSPITAL | Age: 81
End: 2021-11-07

## 2021-11-07 NOTE — OUTREACH NOTE
Prep Survey      Responses   [facility] facility patient discharged from? Non-BH   Is LACE score < 7 ? Non-BH Discharge   Emergency Room discharge w/ pulse ox? No   Eligibility [facility]   Date of Admission 10/29/21   Date of Discharge 11/07/21   Discharge diagnosis unavailable   Does the patient have one of the following disease processes/diagnoses(primary or secondary)? Other   Prep survey completed? Yes          Jolanta Navarro RN

## 2021-11-08 ENCOUNTER — TRANSITIONAL CARE MANAGEMENT TELEPHONE ENCOUNTER (OUTPATIENT)
Dept: CALL CENTER | Facility: HOSPITAL | Age: 81
End: 2021-11-08

## 2021-11-08 NOTE — OUTREACH NOTE
Call Center TCM Note      Responses   [facility] patient discharged from? Non-BH   Does the patient have one of the following disease processes/diagnoses(primary or secondary)? Other   TCM attempt successful? No   Unsuccessful attempts Attempt 2          Chelo Waite MA    11/8/2021, 17:15 EST

## 2021-11-08 NOTE — OUTREACH NOTE
Call Center TCM Note      Responses   [facility] patient discharged from? Non-BH   Does the patient have one of the following disease processes/diagnoses(primary or secondary)? Other   TCM attempt successful? No   Unsuccessful attempts Attempt 1          Chelo Waite MA    11/8/2021, 16:19 EST

## 2021-11-09 ENCOUNTER — TRANSITIONAL CARE MANAGEMENT TELEPHONE ENCOUNTER (OUTPATIENT)
Dept: CALL CENTER | Facility: HOSPITAL | Age: 81
End: 2021-11-09

## 2021-11-09 NOTE — OUTREACH NOTE
Call Center TCM Note      Responses   [facility] patient discharged from? Non-   Does the patient have one of the following disease processes/diagnoses(primary or secondary)? Other   TCM attempt successful? No   Unsuccessful attempts Attempt 3          Charo Lynn RN    11/9/2021, 14:46 EST

## 2021-12-15 DIAGNOSIS — I10 BENIGN ESSENTIAL HYPERTENSION: Primary | ICD-10-CM

## 2021-12-15 PROCEDURE — 99442 PR PHYS/QHP TELEPHONE EVALUATION 11-20 MIN: CPT | Performed by: NURSE PRACTITIONER

## 2021-12-15 RX ORDER — LISINOPRIL 10 MG/1
10 TABLET ORAL DAILY
Qty: 30 TABLET | Refills: 1 | Status: SHIPPED | OUTPATIENT
Start: 2021-12-15 | End: 2022-10-10

## 2021-12-15 NOTE — PROGRESS NOTES
Subjective   Driss Ackerman is a 81 y.o. male. Patient is here today for No chief complaint on file.  .    History of Present Illness   Patient's wife called because patient's BP is elevated.  He was seen at his dentist office this morning and his blood pressure was 182/102, 165/98, and 178/100.  Patient is on midodrine 2.5 mg twice daily for orthostatic hypotension.  Patient denies any headaches or chest pain.    The following portions of the patient's history were reviewed and updated as appropriate: allergies, current medications, past family history, past medical history, past social history, past surgical history and problem list.    Review of Systems    Objective   There were no vitals filed for this visit.  There is no height or weight on file to calculate BMI.  Physical Exam    Assessment/Plan   Diagnoses and all orders for this visit:    1. Benign essential hypertension (Primary)  -     lisinopril (PRINIVIL,ZESTRIL) 10 MG tablet; Take 1 tablet by mouth Daily.  Dispense: 30 tablet; Refill: 1    Stop midodrine. Start lisinopril tonight. Call in the morning to make an appt to see Dr. De La Cruz next week (or sooner if BP continues to be elevated)      Telephone call lasted 13 minutes

## 2021-12-21 ENCOUNTER — OFFICE VISIT (OUTPATIENT)
Dept: INTERNAL MEDICINE | Facility: CLINIC | Age: 81
End: 2021-12-21

## 2021-12-21 VITALS
OXYGEN SATURATION: 96 % | HEART RATE: 59 BPM | BODY MASS INDEX: 28.01 KG/M2 | TEMPERATURE: 97.3 F | SYSTOLIC BLOOD PRESSURE: 112 MMHG | WEIGHT: 200.1 LBS | HEIGHT: 71 IN | DIASTOLIC BLOOD PRESSURE: 70 MMHG

## 2021-12-21 DIAGNOSIS — G20 PARKINSON DISEASE (HCC): ICD-10-CM

## 2021-12-21 DIAGNOSIS — I10 BENIGN ESSENTIAL HYPERTENSION: Primary | ICD-10-CM

## 2021-12-21 PROCEDURE — 99214 OFFICE O/P EST MOD 30 MIN: CPT | Performed by: INTERNAL MEDICINE

## 2021-12-21 NOTE — PROGRESS NOTES
Subjective   Driss Ackerman is a 81 y.o. male here to follow up BP.   Pt was stopped midodrine and was Start lisinopril on 12/15/21.    History of Present Illness   He had had the lisinopril stopped for hypotension and now he has been having int elevations in BP   Still ataxia from parkinsons  HE needs PT  To discuss with neuro    The following portions of the patient's history were reviewed and updated as appropriate: allergies, current medications, past medical history, past social history and problem list.  No tob no etoh  Review of Systems    Objective   Physical Exam  Vitals reviewed.   Constitutional:       Appearance: He is well-developed.   HENT:      Head: Normocephalic and atraumatic.      Right Ear: External ear normal.      Left Ear: External ear normal.   Eyes:      Conjunctiva/sclera: Conjunctivae normal.      Pupils: Pupils are equal, round, and reactive to light.   Neck:      Thyroid: No thyromegaly.      Trachea: No tracheal deviation.   Cardiovascular:      Rate and Rhythm: Normal rate and regular rhythm.      Heart sounds: Normal heart sounds.   Pulmonary:      Effort: Pulmonary effort is normal.      Breath sounds: Normal breath sounds.   Abdominal:      General: Bowel sounds are normal. There is no distension.      Palpations: Abdomen is soft.      Tenderness: There is no abdominal tenderness.   Musculoskeletal:         General: No deformity. Normal range of motion.      Cervical back: Normal range of motion.   Skin:     General: Skin is warm and dry.   Neurological:      Mental Status: He is alert and oriented to person, place, and time.   Psychiatric:         Behavior: Behavior normal.         Thought Content: Thought content normal.         Judgment: Judgment normal.         Vitals:    12/21/21 1111   BP: 112/70   Pulse: 59   Temp: 97.3 °F (36.3 °C)   SpO2: 96%     Current Outpatient Medications:   •  acetaminophen (TYLENOL) 500 MG tablet, Take 500 mg by mouth Every 6 (Six) Hours As Needed  for Mild Pain ., Disp: , Rfl:   •  aspirin 81 MG EC tablet, Take 81 mg by mouth Daily., Disp: , Rfl:   •  carbidopa-levodopa (SINEMET)  MG per tablet, Take 1 tablet by mouth. 1 tablet with each meal and one at bedtime. Total 4 tablet, Disp: , Rfl:   •  CBD (cannabidiol) oral oil, Take  by mouth., Disp: , Rfl:   •  citalopram (CeleXA) 40 MG tablet, Take 1 tablet by mouth Daily., Disp: 90 tablet, Rfl: 3  •  Coenzyme Q10 (CO Q 10) 100 MG capsule, Take 1 tablet by mouth Daily., Disp: , Rfl:   •  Cyanocobalamin (VITAMIN B-12 ER) 2000 MCG tablet controlled-release, Place 1 tablet under the tongue Daily., Disp: , Rfl:   •  Docusate Calcium (STOOL SOFTENER PO), Take 1 tablet by mouth Daily., Disp: , Rfl:   •  fluticasone (FLONASE) 50 MCG/ACT nasal spray, 2 sprays into each nostril Daily., Disp: 3 bottle, Rfl: 1  •  lisinopril (PRINIVIL,ZESTRIL) 10 MG tablet, Take 1 tablet by mouth Daily., Disp: 30 tablet, Rfl: 1  •  mesalamine (LIALDA) 1.2 G EC tablet, Take 3 tablets by mouth Daily. (Patient taking differently: Take 3.6 g by mouth Daily. Pt takes 4 tabs a day), Disp: 270 tablet, Rfl: 1  •  Omega-3 Fatty Acids (FISH OIL) 1000 MG capsule capsule, Take  by mouth daily with breakfast., Disp: , Rfl:     Body mass index is 28.01 kg/m².         Assessment/Plan   Diagnoses and all orders for this visit:    1. Benign essential hypertension (Primary)    2. Parkinson disease (HCC)        1.  HTN-  We have added back the lisinoprill take it at night and follow if issues with low BP will change to 5mg   This is going to be a balance with the parkinsons  Wife will call with BPs in a few weeks  2.  PArkinsons-  He needs to be with PT  To discuss with neuro

## 2022-01-06 ENCOUNTER — TELEPHONE (OUTPATIENT)
Dept: INTERNAL MEDICINE | Facility: CLINIC | Age: 82
End: 2022-01-06

## 2022-01-06 NOTE — TELEPHONE ENCOUNTER
Caller: [facility]     Relationship: [employer]     Best call back number: [phone]    What form or medical record are you requesting: CHART  NOTES FROM VISIT WITH PCP  01/04/2022     Who is requesting this form or medical record from you: [employer] FOR A POWERED WHEEL CHAIR    How would you like to receive the form or medical records (pick-up, mail, fax): FAX    [phone]    Timeframe paperwork needed: ASAP     Additional notes: PATIENT HAS PT APPOINTMENT ON Monday, January 10, 2022

## 2022-01-11 NOTE — TELEPHONE ENCOUNTER
PATIENTS WIFE IS CALLING IN SHE WANTED TO MAKE DOCTOR AWARE THAT THEY DID UPLOAD THE QUESTIONS AND DOCUMENT ON [employer] MESSAGE FOR THE WHEELCHAIR AND WANT HER TO REVIEW AND THEN FIND OUT ABOUT MAKING THE VIDEO APPT . SHE WANTS TO MAKE SURE DOCTOR KNOW WHAT QUESTIONS NEED TO BE ASKED AND REVIEWED FIRST.    PLEASE ADVISE    CALLBACK NUMBER IS  [phone]

## 2022-01-13 ENCOUNTER — TELEMEDICINE (OUTPATIENT)
Dept: INTERNAL MEDICINE | Facility: CLINIC | Age: 82
End: 2022-01-13

## 2022-01-13 VITALS — HEIGHT: 73 IN | BODY MASS INDEX: 25.18 KG/M2 | WEIGHT: 190 LBS
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
## 2022-01-13 DIAGNOSIS — G20 PARKINSON DISEASE: Primary | ICD-10-CM

## 2022-01-13 DIAGNOSIS — R27.0 ATAXIA: ICD-10-CM

## 2022-01-13 DIAGNOSIS — R53.1 WEAKNESS: ICD-10-CM

## 2022-01-13 PROCEDURE — 99212 OFFICE O/P EST SF 10 MIN: CPT | Performed by: INTERNAL MEDICINE

## 2022-01-13 NOTE — PROGRESS NOTES
Subjective   Driss Ackerman is a 81 y.o. male. Here today for face to face mobility exam due Parkinson.  .  HPI:  Patient has a history of Parkinson's disease and has been having more difficulty with ambulation and completing activities of daily living. He cannot ambulate with a medina or a walker  Due to inbalance and weakness resulting in falls, Several falls.  He cannot propel a manual wheel chair due to parkinson  He cannot stand without assistance wife helps him  Electric scooter is too small and makes it impossible with stand from the scooter     He has had some pressure sores that the wife is treating he has had various 1 of varying stages right now he has 1 stage I in the right posterior glute  He cannot walk at all.  Pt can operate a power operated wheel chair in his home to to help with bathing, grooming and dressing.      The following portions of the patient's history were reviewed and updated as appropriate: allergies, current medications, past medical history, past social history and problem list.  He does eat ok per wife  Review of Systems    Objective   There were no vitals filed for this visit.  There is no height or weight on file to calculate BMI.    Physical Exam  Neurological:      Mental Status: He is alert and oriented to person, place, and time.      Motor: Weakness present.      Coordination: Coordination abnormal.      Gait: Gait abnormal.      Comments: Leans to the right has to have assistance to stand   Weakness in both flexures and extensor           Assessment and Plan:    Ataxia and weakness and parkinson's disease-  He is unable to ambulate at all without assistance even with a scooter  He is cannot stand from the scooter due to progression of his disease and weakness  He has had falls with attempting to transfer and wife is tunable to lift him  He needs a wheelchair to help with ambulation and to decrease falls

## 2022-01-20 ENCOUNTER — TELEPHONE (OUTPATIENT)
Dept: INTERNAL MEDICINE | Facility: CLINIC | Age: 82
End: 2022-01-20

## 2022-01-20 RX ORDER — OMEPRAZOLE 40 MG/1
40 CAPSULE, DELAYED RELEASE ORAL DAILY
Qty: 30 CAPSULE | Refills: 2 | Status: SHIPPED | OUTPATIENT
Start: 2022-01-20 | End: 2022-10-10 | Stop reason: SDUPTHER

## 2022-01-20 NOTE — TELEPHONE ENCOUNTER
I spoke with the speech therapist and she wonders if some of his symptoms could be related to acid reflux.  He did complain of a sour taste in his mouth.  We will go ahead and try omeprazole 40 mg a day.  I did leave a message on the machine for the patient to call me back  ----- Message from Chelsy Butler MA sent at 1/20/2022  2:42 PM EST -----  Regarding: FW: SPEECH F/U  PLEASE ADVISE  ----- Message -----  From: Josie Acosta  Sent: 1/20/2022   2:33 PM EST  To: Chelsy Butler MA  Subject: SPEECH F/U                                       Lashell Montes with [employer] would like you to call her concerning the speech therapy for this patient. Her number is [phone]. Thanks

## 2022-02-17 ENCOUNTER — TELEPHONE (OUTPATIENT)
Dept: INTERNAL MEDICINE | Facility: CLINIC | Age: 82
End: 2022-02-17

## 2022-02-17 NOTE — TELEPHONE ENCOUNTER
[facility] called stating they did not receive the face to face paperwork back on his mobility chair. It looks like a company [employer] has the same thing in the chart. This was sent in. Can you check on this?

## 2022-07-29 ENCOUNTER — READMISSION MANAGEMENT (OUTPATIENT)
Dept: CALL CENTER | Facility: HOSPITAL | Age: 82
End: 2022-07-29

## 2022-07-29 ENCOUNTER — TRANSITIONAL CARE MANAGEMENT TELEPHONE ENCOUNTER (OUTPATIENT)
Dept: CALL CENTER | Facility: HOSPITAL | Age: 82
End: 2022-07-29

## 2022-07-29 NOTE — OUTREACH NOTE
Prep Survey    Flowsheet Row Responses   [facility] facility patient discharged from? Non-[facility]   Is LACE score < 7 ? Non-BH Discharge   Emergency Room discharge w/ pulse ox? No   Eligibility [facility]   Date of Discharge 07/28/22   Discharge diagnosis Unavailable   Does the patient have one of the following disease processes/diagnoses(primary or secondary)? Other   Prep survey completed? Yes          WHITNEY EM - Registered Nurse

## 2022-07-29 NOTE — OUTREACH NOTE
Call Center TCM Note    Flowsheet Row Responses   [facility] patient discharged from? Non-  [[facility]]   Does the patient have one of the following disease processes/diagnoses(primary or secondary)? Other   TCM attempt successful? Yes   Call start time 1444   Call end time 1451   Discharge diagnosis Parkinson's (Wife reports that patient was admitted to facility for a couple of weeks while she was out of town. Patient was not worsened or ill )    Person spoke with today (if not patient) and relationship spouse, Trina Ackerman   Meds reviewed with patient/caregiver? Yes  [Patient on usual home meds, no changes. ]   Does the patient have all medications ordered at discharge? N/A   Is the patient taking all medications as directed (includes completed medication regime)? Yes   Does the patient have a primary care provider?  Yes   Comments regarding PCP PCP Dr De La Cruz. Wife declined need for patient to have appt at this time.    Has the patient kept scheduled appointments due by today? N/A   Has home health visited the patient within 72 hours of discharge? N/A   Psychosocial issues? No   Psychosocial comments Wife reports that patient has in home caregivers to assist her.    Did the patient receive a copy of their discharge instructions? Yes   What is the patient's perception of their health status since discharge? Same   Is the patient/caregiver able to teach back signs and symptoms related to disease process for when to call PCP? Yes   Is the patient/caregiver able to teach back signs and symptoms related to disease process for when to call 911? Yes   Is the patient/caregiver able to teach back the hierarchy of who to call/visit for symptoms/problems? PCP, Specialist, Home health nurse, Urgent Care, ED, 911 Yes   If the patient is a current smoker, are they able to teach back resources for cessation? Not a smoker   TCM call completed? Yes          Chelo Scott, RN    7/29/2022, 14:51  EDT

## 2022-08-18 ENCOUNTER — APPOINTMENT (OUTPATIENT)
Dept: LAB | Facility: HOSPITAL | Age: 82
End: 2022-08-18

## 2022-08-23 ENCOUNTER — TELEPHONE (OUTPATIENT)
Dept: ONCOLOGY | Facility: CLINIC | Age: 82
End: 2022-08-23

## 2022-08-23 NOTE — TELEPHONE ENCOUNTER
Caller: Trina Ackerman    Relationship to patient: Emergency Contact    Best call back number:[phone]    Type of visit: LAB AND FOLLOW UP    Requested date: END OF September AT [facility]    If rescheduling, when is the original appointment: 08/26    Additional notes: PLEASE CALL ONCE R/S. HUB UNABLE TO R/S WITHIN TIMEFRAME.

## 2022-08-26 ENCOUNTER — APPOINTMENT (OUTPATIENT)
Dept: LAB | Facility: HOSPITAL | Age: 82
End: 2022-08-26

## 2022-09-16 ENCOUNTER — TELEPHONE (OUTPATIENT)
Dept: INTERNAL MEDICINE | Facility: CLINIC | Age: 82
End: 2022-09-16

## 2022-09-16 DIAGNOSIS — E78.5 HYPERLIPIDEMIA, UNSPECIFIED HYPERLIPIDEMIA TYPE: ICD-10-CM

## 2022-09-16 DIAGNOSIS — E55.9 VITAMIN D DEFICIENCY: ICD-10-CM

## 2022-09-16 DIAGNOSIS — Z00.00 MEDICARE ANNUAL WELLNESS VISIT, SUBSEQUENT: Primary | ICD-10-CM

## 2022-09-16 NOTE — TELEPHONE ENCOUNTER
LABS ORDERED TO BE DONE AT [facility]      ----- Message from Darell Coon Rep sent at 9/15/2022 11:28 AM EDT -----  Regarding: LABS  Patient is going to try to get our labs done when he goes to [facility] on 10/6 for his  Wellness here on 10/10.  If you can put them in, wife is going to call them to be sure they can draw all labs for us and them on 10/6.  Thanks.

## 2022-09-22 ENCOUNTER — TELEMEDICINE (OUTPATIENT)
Dept: INTERNAL MEDICINE | Facility: CLINIC | Age: 82
End: 2022-09-22
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
## 2022-09-22 DIAGNOSIS — G20 PARKINSON DISEASE: Primary | ICD-10-CM

## 2022-09-22 PROCEDURE — 99212 OFFICE O/P EST SF 10 MIN: CPT | Performed by: INTERNAL MEDICINE

## 2022-09-22 NOTE — PROGRESS NOTES
Subjective   Driss Ackerman is a 82 y.o. male here today to discuss modifications for wheelchair for parkinson's.  Swing away faith stick mounting bracket, lateral pelvic thigh pads, rear shroud modification.  You have chosen to receive care through a telehealth visit.  Do you consent to use a video/audio connection for your medical care today? Yes  I conducted this video visit from my office at [address] [facility]  Patient was in his home.    History of Present Illness   Patient continues to suffer from his Parkinson's.  He is unable to ambulate without the assistance of a wheelchair but a standard wheelchair is no longer providing the support he needs.  Due to limitations in his upper body he will need a joystick to move the wheelchair.  He also needs pelvic thigh pads and shroud modification for his back  This is due to his progressive weakness and immobility which is part of the normal progression of Parkinson's  The OT is afraid he will run over his leg as     The following portions of the patient's history were reviewed and updated as appropriate: allergies, current medications, past medical history, past social history and problem list.    Review of Systems    Objective   Physical Exam    There were no vitals filed for this visit.  There is no height or weight on file to calculate BMI.       Current Outpatient Medications:   •  acetaminophen (TYLENOL) 500 MG tablet, Take 500 mg by mouth Every 6 (Six) Hours As Needed for Mild Pain ., Disp: , Rfl:   •  carbidopa-levodopa (SINEMET)  MG per tablet, Take 1.5 tablets by mouth 4 (Four) Times a Day., Disp: , Rfl:   •  CBD (cannabidiol) oral oil, Take  by mouth., Disp: , Rfl:   •  citalopram (CeleXA) 40 MG tablet, Take 1 tablet by mouth Daily., Disp: 90 tablet, Rfl: 3  •  Coenzyme Q10 (CO Q 10) 100 MG capsule, Take 1 tablet by mouth Daily., Disp: , Rfl:   •  Cyanocobalamin (VITAMIN B-12 ER) 2000 MCG tablet controlled-release, Place 1 tablet under  the tongue Daily., Disp: , Rfl:   •  Docusate Calcium (STOOL SOFTENER PO), Take 1 tablet by mouth Daily., Disp: , Rfl:   •  fluticasone (FLONASE) 50 MCG/ACT nasal spray, 2 sprays into each nostril Daily., Disp: 3 bottle, Rfl: 1  •  lisinopril (PRINIVIL,ZESTRIL) 10 MG tablet, Take 1 tablet by mouth Daily., Disp: 30 tablet, Rfl: 1  •  mesalamine (LIALDA) 1.2 G EC tablet, Take 3 tablets by mouth Daily. (Patient taking differently: Take 3.6 g by mouth Daily. Pt takes 4 tabs a day), Disp: 270 tablet, Rfl: 1  •  Omega-3 Fatty Acids (FISH OIL) 1000 MG capsule capsule, Take  by mouth daily with breakfast., Disp: , Rfl:   •  aspirin 81 MG EC tablet, Take 81 mg by mouth Daily., Disp: , Rfl:   •  omeprazole (priLOSEC) 40 MG capsule, Take 1 capsule by mouth Daily., Disp: 30 capsule, Rfl: 2      Assessment & Plan   Diagnoses and all orders for this visit:    1. Parkinson disease (HCC) (Primary)    2. Fall, sequela        1.  Parkinson's disease: Pt is having progressive weakness and imbalance from parkinsons-  He needs a new swing away faith stick mounting bracket that will swing out of the way so he is able to get in and out of the wheelchair and transfer to a chair or the bed.  According to his occupational therapist patient also would benefit from a rear shroud modification as he is running into things because he cannot see behind.  Patient also needs the lateral pelvic and thigh pads to help keep his legs in the chair so he does not run over his own foot.

## 2022-10-04 RX ORDER — CITALOPRAM 40 MG/1
40 TABLET ORAL DAILY
Qty: 90 TABLET | Refills: 3 | Status: SHIPPED | OUTPATIENT
Start: 2022-10-04

## 2022-10-04 RX ORDER — GUAIFENESIN 400 MG
1 TABLET ORAL DAILY
Qty: 90 EACH | Refills: 3 | Status: SHIPPED | OUTPATIENT
Start: 2022-10-04

## 2022-10-04 NOTE — TELEPHONE ENCOUNTER
Caller: Driss Ackeramn    Relationship: Self    Best call back number: [phone]      Requested Prescriptions:   Requested Prescriptions     Pending Prescriptions Disp Refills   • citalopram (CeleXA) 40 MG tablet 90 tablet 3     Sig: Take 1 tablet by mouth Daily.        Pharmacy where request should be sent: [facility] [place] [facility] - [address] [phone] [facility] [phone] [facility]     Additional details provided by patient: PATIENT HAS ABOUT A WEEK AND 3 DAYS LEFT AND NEEDS TO GO TO MAIL ORDER.    Does the patient have less than a 3 day supply:  [] Yes  [x] No    Sallie Negron, PCT   10/04/22 12:43 EDT

## 2022-10-06 ENCOUNTER — LAB (OUTPATIENT)
Dept: LAB | Facility: HOSPITAL | Age: 82
End: 2022-10-06

## 2022-10-06 ENCOUNTER — APPOINTMENT (OUTPATIENT)
Dept: LAB | Facility: HOSPITAL | Age: 82
End: 2022-10-06

## 2022-10-06 DIAGNOSIS — Z00.00 MEDICARE ANNUAL WELLNESS VISIT, SUBSEQUENT: ICD-10-CM

## 2022-10-06 DIAGNOSIS — E78.5 HYPERLIPIDEMIA, UNSPECIFIED HYPERLIPIDEMIA TYPE: ICD-10-CM

## 2022-10-06 DIAGNOSIS — E55.9 VITAMIN D DEFICIENCY: Primary | ICD-10-CM

## 2022-10-06 LAB
25(OH)D3 SERPL-MCNC: 13.5 NG/ML (ref 30–100)
ALBUMIN SERPL-MCNC: 4.3 G/DL (ref 3.5–5.2)
ALBUMIN/GLOB SERPL: 2 G/DL (ref 1.1–2.4)
ALP SERPL-CCNC: 72 U/L (ref 38–116)
ALT SERPL W P-5'-P-CCNC: <5 U/L (ref 0–41)
ANION GAP SERPL CALCULATED.3IONS-SCNC: 9.1 MMOL/L (ref 5–15)
AST SERPL-CCNC: 7 U/L (ref 0–40)
BASOPHILS # BLD AUTO: 0.05 10*3/MM3 (ref 0–0.2)
BASOPHILS NFR BLD AUTO: 0.3 % (ref 0–1.5)
BILIRUB SERPL-MCNC: 0.4 MG/DL (ref 0.2–1.2)
BUN SERPL-MCNC: 15 MG/DL (ref 6–20)
BUN/CREAT SERPL: 34.9 (ref 7.3–30)
CALCIUM SPEC-SCNC: 9.4 MG/DL (ref 8.5–10.2)
CHLORIDE SERPL-SCNC: 104 MMOL/L (ref 98–107)
CHOLEST SERPL-MCNC: 149 MG/DL (ref 0–200)
CO2 SERPL-SCNC: 26.9 MMOL/L (ref 22–29)
CREAT SERPL-MCNC: 0.43 MG/DL (ref 0.7–1.3)
DEPRECATED RDW RBC AUTO: 52.6 FL (ref 37–54)
EGFRCR SERPLBLD CKD-EPI 2021: 106.6 ML/MIN/1.73
EOSINOPHIL # BLD AUTO: 0.32 10*3/MM3 (ref 0–0.4)
EOSINOPHIL NFR BLD AUTO: 1.9 % (ref 0.3–6.2)
ERYTHROCYTE [DISTWIDTH] IN BLOOD BY AUTOMATED COUNT: 14.4 % (ref 12.3–15.4)
GLOBULIN UR ELPH-MCNC: 2.1 GM/DL (ref 1.8–3.5)
GLUCOSE SERPL-MCNC: 101 MG/DL (ref 74–124)
HCT VFR BLD AUTO: 36.4 % (ref 37.5–51)
HDLC SERPL-MCNC: 36 MG/DL (ref 40–60)
HGB BLD-MCNC: 11.7 G/DL (ref 13–17.7)
IMM GRANULOCYTES # BLD AUTO: 0.03 10*3/MM3 (ref 0–0.05)
IMM GRANULOCYTES NFR BLD AUTO: 0.2 % (ref 0–0.5)
LDLC SERPL CALC-MCNC: 92 MG/DL (ref 0–100)
LDLC/HDLC SERPL: 2.5 {RATIO}
LYMPHOCYTES # BLD AUTO: 11.03 10*3/MM3 (ref 0.7–3.1)
LYMPHOCYTES NFR BLD AUTO: 65.1 % (ref 19.6–45.3)
MCH RBC QN AUTO: 31.9 PG (ref 26.6–33)
MCHC RBC AUTO-ENTMCNC: 32.1 G/DL (ref 31.5–35.7)
MCV RBC AUTO: 99.2 FL (ref 79–97)
MONOCYTES # BLD AUTO: 0.38 10*3/MM3 (ref 0.1–0.9)
MONOCYTES NFR BLD AUTO: 2.2 % (ref 5–12)
NEUTROPHILS NFR BLD AUTO: 30.3 % (ref 42.7–76)
NEUTROPHILS NFR BLD AUTO: 5.14 10*3/MM3 (ref 1.7–7)
NRBC BLD AUTO-RTO: 0 /100 WBC (ref 0–0.2)
PLATELET # BLD AUTO: 171 10*3/MM3 (ref 140–450)
PMV BLD AUTO: 8.9 FL (ref 6–12)
POTASSIUM SERPL-SCNC: 4.8 MMOL/L (ref 3.5–4.7)
PROT SERPL-MCNC: 6.4 G/DL (ref 6.3–8)
RBC # BLD AUTO: 3.67 10*6/MM3 (ref 4.14–5.8)
SODIUM SERPL-SCNC: 140 MMOL/L (ref 134–145)
TRIGL SERPL-MCNC: 115 MG/DL (ref 0–150)
TSH SERPL DL<=0.05 MIU/L-ACNC: 0.48 UIU/ML (ref 0.27–4.2)
VLDLC SERPL-MCNC: 21 MG/DL (ref 5–40)
WBC NRBC COR # BLD: 16.95 10*3/MM3 (ref 3.4–10.8)

## 2022-10-06 PROCEDURE — 85025 COMPLETE CBC W/AUTO DIFF WBC: CPT

## 2022-10-06 PROCEDURE — 80061 LIPID PANEL: CPT | Performed by: INTERNAL MEDICINE

## 2022-10-06 PROCEDURE — 82306 VITAMIN D 25 HYDROXY: CPT | Performed by: INTERNAL MEDICINE

## 2022-10-06 PROCEDURE — 36415 COLL VENOUS BLD VENIPUNCTURE: CPT

## 2022-10-06 PROCEDURE — 84443 ASSAY THYROID STIM HORMONE: CPT | Performed by: INTERNAL MEDICINE

## 2022-10-06 PROCEDURE — 80053 COMPREHEN METABOLIC PANEL: CPT

## 2022-10-10 ENCOUNTER — OFFICE VISIT (OUTPATIENT)
Dept: INTERNAL MEDICINE | Facility: CLINIC | Age: 82
End: 2022-10-10

## 2022-10-10 VITALS
WEIGHT: 183.2 LBS | OXYGEN SATURATION: 98 % | BODY MASS INDEX: 24.28 KG/M2 | TEMPERATURE: 97.7 F | SYSTOLIC BLOOD PRESSURE: 124 MMHG | DIASTOLIC BLOOD PRESSURE: 70 MMHG | HEIGHT: 73 IN | HEART RATE: 65 BPM

## 2022-10-10 DIAGNOSIS — E55.9 VITAMIN D DEFICIENCY: ICD-10-CM

## 2022-10-10 DIAGNOSIS — I10 BENIGN ESSENTIAL HYPERTENSION: ICD-10-CM

## 2022-10-10 DIAGNOSIS — K21.9 GASTROESOPHAGEAL REFLUX DISEASE, UNSPECIFIED WHETHER ESOPHAGITIS PRESENT: ICD-10-CM

## 2022-10-10 DIAGNOSIS — E78.00 HYPERCHOLESTEROLEMIA: ICD-10-CM

## 2022-10-10 DIAGNOSIS — G20 PARKINSON DISEASE: ICD-10-CM

## 2022-10-10 DIAGNOSIS — Z00.00 MEDICARE ANNUAL WELLNESS VISIT, SUBSEQUENT: Primary | ICD-10-CM

## 2022-10-10 DIAGNOSIS — Z23 NEED FOR INFLUENZA VACCINATION: ICD-10-CM

## 2022-10-10 PROCEDURE — 1125F AMNT PAIN NOTED PAIN PRSNT: CPT | Performed by: INTERNAL MEDICINE

## 2022-10-10 PROCEDURE — 1159F MED LIST DOCD IN RCRD: CPT | Performed by: INTERNAL MEDICINE

## 2022-10-10 PROCEDURE — G0439 PPPS, SUBSEQ VISIT: HCPCS | Performed by: INTERNAL MEDICINE

## 2022-10-10 PROCEDURE — 1170F FXNL STATUS ASSESSED: CPT | Performed by: INTERNAL MEDICINE

## 2022-10-10 PROCEDURE — 99213 OFFICE O/P EST LOW 20 MIN: CPT | Performed by: INTERNAL MEDICINE

## 2022-10-10 PROCEDURE — G0008 ADMIN INFLUENZA VIRUS VAC: HCPCS | Performed by: INTERNAL MEDICINE

## 2022-10-10 PROCEDURE — 90662 IIV NO PRSV INCREASED AG IM: CPT | Performed by: INTERNAL MEDICINE

## 2022-10-10 RX ORDER — ERGOCALCIFEROL 1.25 MG/1
50000 CAPSULE ORAL WEEKLY
Qty: 12 CAPSULE | Refills: 0 | Status: SHIPPED | OUTPATIENT
Start: 2022-10-10

## 2022-10-10 RX ORDER — OMEPRAZOLE 40 MG/1
40 CAPSULE, DELAYED RELEASE ORAL DAILY
Qty: 30 CAPSULE | Refills: 3 | Status: SHIPPED | OUTPATIENT
Start: 2022-10-10

## 2022-10-10 NOTE — PROGRESS NOTES
The ABCs of the Annual Wellness Visit  Subsequent Medicare Wellness Visit    Chief Complaint   Patient presents with   • Medicare Wellness-subsequent      Subjective    History of Present Illness:  Driss Ackerman is a 82 y.o. male who presents for a Subsequent Medicare Wellness Visit.    The following portions of the patient's history were reviewed and   updated as appropriate: allergies, current medications, past medical history, past social history and problem list.    Compared to one year ago, the patient feels his physical   health is worse.weaker    Compared to one year ago, the patient feels his mental   health is worse.forgets more things  Depression better with celexa      Recent Hospitalizations:  He was not admitted to the hospital during the last year.       Current Medical Providers:  Patient Care Team:  Sanjuana De La Cruz MD as PCP - General  Sanjuana De La Cruz MD as Referring Physician (Internal Medicine)  Alvin Jackman MD as Consulting Physician (Hematology and Oncology)    Outpatient Medications Prior to Visit   Medication Sig Dispense Refill   • acetaminophen (TYLENOL) 500 MG tablet Take 500 mg by mouth Every 6 (Six) Hours As Needed for Mild Pain .     • carbidopa-levodopa (SINEMET)  MG per tablet Take 1.5 tablets by mouth 4 (Four) Times a Day.     • CBD (cannabidiol) oral oil Take  by mouth.     • citalopram (CeleXA) 40 MG tablet Take 1 tablet by mouth Daily. 90 tablet 3   • Cyanocobalamin (Vitamin B-12 ER) 2000 MCG tablet controlled-release Place 1 tablet under the tongue Daily. 90 each 3   • Docusate Calcium (STOOL SOFTENER PO) Take 1 tablet by mouth Daily.     • fluticasone (FLONASE) 50 MCG/ACT nasal spray 2 sprays into each nostril Daily. (Patient taking differently: 2 sprays into the nostril(s) as directed by provider As Needed.) 3 bottle 1   • mesalamine (LIALDA) 1.2 G EC tablet Take 3 tablets by mouth Daily. (Patient taking differently: Take 3 tablets by mouth Daily. Pt takes 4 tabs a day)  270 tablet 1   • Omega-3 Fatty Acids (FISH OIL) 1000 MG capsule capsule Take  by mouth daily with breakfast.     • aspirin 81 MG EC tablet Take 81 mg by mouth Daily.     • Coenzyme Q10 (CO Q 10) 100 MG capsule Take 1 tablet by mouth Daily.     • lisinopril (PRINIVIL,ZESTRIL) 10 MG tablet Take 1 tablet by mouth Daily. 30 tablet 1   • omeprazole (priLOSEC) 40 MG capsule Take 1 capsule by mouth Daily. 30 capsule 2     No facility-administered medications prior to visit.       No opioid medication identified on active medication list. I have reviewed chart for other potential  high risk medication/s and harmful drug interactions in the elderly.          Aspirin is on active medication list. Aspirin use is indicated based on review of current medical condition/s. Pros and cons of this therapy have been discussed today. Benefits of this medication outweigh potential harm.  Patient has been encouraged to continue taking this medication.  .      Patient Active Problem List   Diagnosis   • Chronic coronary artery disease   • Arthritis   • Atrial fibrillation (HCC)   • Benign prostatic hyperplasia with urinary obstruction   • Fatigue   • Hypercholesterolemia   • Hypogonadism in male   • Memory impairment   • Ulcerative colitis (HCC)   • Vitamin D deficiency   • Atopic rhinitis   • Benign essential hypertension   • Chronic lymphocytic leukemia (HCC)   • Thrombocytopenia (HCC)   • Ulcerative proctitis with complication (HCC)   • Parkinson disease (HCC)   • PAVEL on CPAP   • Apathy   • History of colonic polyps   • Sialorrhea   • Dysarthria   • Fall   • Skin cancer of arm, right     Advance Care Planning  Advance Directive is on file.  ACP discussion was held with the patient during this visit. Patient has an advance directive in EMR which is still valid.           Objective    Vitals:    10/10/22 1301   BP: 124/70   BP Location: Left arm   Patient Position: Sitting   Cuff Size: Adult   Pulse: 65   Temp: 97.7 °F (36.5 °C)  "  TempSrc: Temporal   SpO2: 98%   Weight: 83.1 kg (183 lb 3.2 oz)   Height: 185.4 cm (72.99\")   PainSc:   2   PainLoc: Back  Comment: Lower back, right side     Estimated body mass index is 24.18 kg/m² as calculated from the following:    Height as of this encounter: 185.4 cm (72.99\").    Weight as of this encounter: 83.1 kg (183 lb 3.2 oz).    BMI is within normal parameters. No other follow-up for BMI required.      Does the patient have evidence of cognitive impairment? Yes    Physical Exam  Lab Results   Component Value Date    TRIG 115 10/06/2022    HDL 36 (L) 10/06/2022    LDL 92 10/06/2022    VLDL 21 10/06/2022            HEALTH RISK ASSESSMENT    Smoking Status:  Social History     Tobacco Use   Smoking Status Never   Smokeless Tobacco Never     Alcohol Consumption:  Social History     Substance and Sexual Activity   Alcohol Use No     Fall Risk Screen:    STEADI Fall Risk Assessment was completed, and patient is at MODERATE risk for falls. Assessment completed on:10/10/2022    Depression Screening:  PHQ-2/PHQ-9 Depression Screening 10/10/2022   Retired Total Score -   Little Interest or Pleasure in Doing Things 0-->not at all   Feeling Down, Depressed or Hopeless 0-->not at all   PHQ-9: Brief Depression Severity Measure Score 0       Health Habits and Functional and Cognitive Screening:  Functional & Cognitive Status 10/10/2022   Do you have difficulty preparing food and eating? Yes   Do you have difficulty bathing yourself, getting dressed or grooming yourself? Yes   Do you have difficulty using the toilet? Yes   Do you have difficulty moving around from place to place? Yes   Do you have trouble with steps or getting out of a bed or a chair? Yes   Current Diet Well Balanced Diet        Current Diet Comment -   Dental Exam Up to date   Eye Exam Up to date   Exercise (times per week) 2 times per week   Current Exercises Include Other        Exercise Comment -   Current Exercise Activities Include -   Do you " need help using the phone?  No   Are you deaf or do you have serious difficulty hearing?  No   Do you need help with transportation? Yes   Do you need help shopping? Yes   Do you need help preparing meals?  Yes   Do you need help with housework?  Yes   Do you need help with laundry? Yes   Do you need help taking your medications? No   Do you need help managing money? Yes   Do you ever drive or ride in a car without wearing a seat belt? No   Have you felt unusual stress, anger or loneliness in the last month? No   Who do you live with? Spouse   If you need help, do you have trouble finding someone available to you? No   Have you been bothered in the last four weeks by sexual problems? No   Do you have difficulty concentrating, remembering or making decisions? Yes       Age-appropriate Screening Schedule:  Refer to the list below for future screening recommendations based on patient's age, sex and/or medical conditions. Orders for these recommended tests are listed in the plan section. The patient has been provided with a written plan.    Health Maintenance   Topic Date Due   • TDAP/TD VACCINES (1 - Tdap) Never done   • ZOSTER VACCINE (1 of 2) 05/06/2011   • INFLUENZA VACCINE  08/01/2022   • LIPID PANEL  10/06/2023              Assessment & Plan   CMS Preventative Services Quick Reference  Risk Factors Identified During Encounter  Immunizations Discussed/Encouraged (specific Immunizations; Shingrix  The above risks/problems have been discussed with the patient.  Follow up actions/plans if indicated are seen below in the Assessment/Plan Section.  Pertinent information has been shared with the patient in the After Visit Summary.    Diagnoses and all orders for this visit:    1. Medicare annual wellness visit, subsequent (Primary)    2. Benign essential hypertension    3. Hypercholesterolemia    4. Parkinson disease (HCC)    5. Gastroesophageal reflux disease, unspecified whether esophagitis present    Other orders  -      omeprazole (priLOSEC) 40 MG capsule; Take 1 capsule by mouth Daily.  Dispense: 30 capsule; Refill: 3  -     vitamin D (ERGOCALCIFEROL) 1.25 MG (17494 UT) capsule capsule; Take 1 capsule by mouth 1 (One) Time Per Week.  Dispense: 12 capsule; Refill: 0        Follow Up:   No follow-ups on file.     An After Visit Summary and PPPS were made available to the patient.

## 2022-10-10 NOTE — PROGRESS NOTES
Subjective   Driss Ackerman is a 82 y.o. male.   Fu [place]  He has been haivng some increasing acid reflux  But wife thinks they have been forgetting omeprazole    History of Present Illness   Pt has been taking BP meds as prescribed without any problems.  No HA  No episodes of orthostasis  He has been stable with parkinsons  He has been having increasing reflux      The following portions of the patient's history were reviewed and updated as appropriate: allergies, current medications, past medical history, past social history and problem list.  No tob no etoh  Review of Systems    Objective   Physical Exam  Vitals reviewed.   Constitutional:       Appearance: He is well-developed.   HENT:      Head: Normocephalic and atraumatic.      Right Ear: External ear normal.      Left Ear: External ear normal.   Eyes:      Conjunctiva/sclera: Conjunctivae normal.      Pupils: Pupils are equal, round, and reactive to light.   Neck:      Thyroid: No thyromegaly.      Trachea: No tracheal deviation.   Cardiovascular:      Rate and Rhythm: Normal rate and regular rhythm.      Heart sounds: Normal heart sounds.   Pulmonary:      Effort: Pulmonary effort is normal.      Breath sounds: Normal breath sounds.   Abdominal:      General: Bowel sounds are normal. There is no distension.      Palpations: Abdomen is soft.      Tenderness: There is no abdominal tenderness.   Musculoskeletal:         General: No deformity. Normal range of motion.      Cervical back: Normal range of motion.   Skin:     General: Skin is warm and dry.   Neurological:      Mental Status: He is alert and oriented to person, place, and time.   Psychiatric:         Behavior: Behavior normal.         Thought Content: Thought content normal.         Judgment: Judgment normal.         Vitals:    10/10/22 1301   BP: 124/70   Pulse: 65   Temp: 97.7 °F (36.5 °C)   SpO2: 98%     Body mass index is 24.18 kg/m².         Assessment & Plan   Diagnoses and all orders for this  visit:    1. Medicare annual wellness visit, subsequent (Primary)    2. Benign essential hypertension    3. Hypercholesterolemia    4. Parkinson disease (HCC)    5. Gastroesophageal reflux disease, unspecified whether esophagitis present    Other orders  -     omeprazole (priLOSEC) 40 MG capsule; Take 1 capsule by mouth Daily.  Dispense: 30 capsule; Refill: 3      1.  GERD-  Needs to rsume omeprazole  2.  Parkinsons disease-  Limited mobility  Slowed speachg and balance  3.  HTN- ok without lisinopril   Will follow  No more hypotension  4.  Urinary incontinence-  Getting a rash  Has a cream  But he doesn't always use it  I have rec trying a powder to see if that helped

## 2022-10-28 ENCOUNTER — OFFICE VISIT (OUTPATIENT)
Dept: ONCOLOGY | Facility: CLINIC | Age: 82
End: 2022-10-28

## 2022-10-28 ENCOUNTER — APPOINTMENT (OUTPATIENT)
Dept: LAB | Facility: HOSPITAL | Age: 82
End: 2022-10-28

## 2022-10-28 VITALS
SYSTOLIC BLOOD PRESSURE: 102 MMHG | HEIGHT: 73 IN | OXYGEN SATURATION: 99 % | HEART RATE: 58 BPM | TEMPERATURE: 97.7 F | BODY MASS INDEX: 24.78 KG/M2 | DIASTOLIC BLOOD PRESSURE: 62 MMHG | WEIGHT: 187 LBS

## 2022-10-28 DIAGNOSIS — C91.10 CLL (CHRONIC LYMPHOCYTIC LEUKEMIA): Primary | ICD-10-CM

## 2022-10-28 PROCEDURE — 99214 OFFICE O/P EST MOD 30 MIN: CPT | Performed by: INTERNAL MEDICINE

## 2022-10-28 RX ORDER — EPINEPHRINE 1 MG/ML
0.3 INJECTION, SOLUTION INTRAMUSCULAR; SUBCUTANEOUS AS NEEDED
Status: CANCELLED | OUTPATIENT
Start: 2022-11-25

## 2022-10-28 RX ORDER — DIPHENHYDRAMINE HCL 25 MG
50 CAPSULE ORAL ONCE AS NEEDED
Status: CANCELLED | OUTPATIENT
Start: 2022-11-25

## 2022-10-28 NOTE — PROGRESS NOTES
REASON FOR FOLLOWUP: CHRONIC LYMPHOCYTIC LEUKEMIA. PATEL STAGE 0     HISTORY OF PRESENT ILLNESS:  DURING THE VISIT WITH THE PATIENT TODAY , PATIENT HAD FACE MASK, I HAD PROPPER PROTECTIVE EQUIPMENT, AND I DID HAND HYGIENE WITH SOAP AND WATER BEFORE AND AFTER THE VISIT.    On 10/28/2022, this 82-year-old white male returns today to the office in the company of his wife, to follow up on his stage 0 chronic lymphoid leukemia that has never required any intervention. In the interim, the patient is no longer walking afraid of falling because of poor balance. He remains in a wheelchair. He remains very social with family and friends, going to book clubs and dining rooms. He has not developed any recent infections or autoimmunity. Mentally he remains sharp and capable of participating in family life. He does not have a lot of appetite but his weight is stable and his bowel activity and urination remain ongoing. He has not had any evidence of cognitive deficit.               Past Medical History:   Diagnosis Date   • Actinic keratosis    • Allergic rhinitis    • Anemia    • Anxiety    • Arteriosclerosis of carotid artery    • Arthritis    • Atrial fibrillation (HCC)     ABLATION   • Atrial flutter (HCC)    • CAD (coronary artery disease)    • Cardiomyopathy (HCC)    • Constipation    • Depression    • Eczema    • Enlarged prostate    • Fatigue    • H/O heart artery stent     X5   • Hearing loss    • HTN (hypertension)    • Hypercholesteremia    • Hypogonadism male    • Leukocytosis    • Memory change    • Mitral regurgitation    • Myalgia    • Osteoarthritis     ANKLE, FOOT, RIGHT   • Parkinson's disease (HCC)    • Prostatitis    • Pulmonic regurgitation    • Right ankle pain    • Skin cancer    • Sleep apnea     CPAP   • Ulcerative colitis (HCC)      Social History     Socioeconomic History   • Marital status:      Spouse name: Trina Ackerman   • Number of children: 6   Tobacco Use   • Smoking status: Never   • Smokeless  tobacco: Never   Substance and Sexual Activity   • Alcohol use: No   • Drug use: No     Family History   Problem Relation Age of Onset   • Heart disease Mother    • Hypertension Mother    • Heart disease Father    • Heart failure Father                  HEMATOLOGY/ONCOLOGY HISTORY:   Patient was initially seen on 3/24/2016 for evaluation leukocytosis. The patient previously was seen by Dr. Landon Phillips and since Dr. Phillips left town recently, he has been seeing Dr. De La Cruz. This patient was seen by orthopedic surgeon, Dr. Gonzalez, for planned right ankle surgical procedure. However, because of elevated WBC patient is referred to us for evaluation and his surgery has been on hold.      Patient reports no fever, sweating, chills and especially no night sweats. He has no significant weight losses (he did have some weight loss after the surgery but has regained). The patient otherwise has no B symptoms or other specific complaints.      According to the patient, he was told having elevated WBC counts in October of 2015 when he had right knee replacement. Per computer records, the last time he had normal WBC was back in July of 2013 when he had laboratory study at Dr. Huntley’s office on 07/16/2013. The study reported a total WBC of 5200 including neutrophils 2600 and lymphocytes 2300. His hemoglobin was 14.7 and platelets 173,000. MCV was 102. His chemistry lab reported normal creatinine of 0.71, normal electrolytes and normal liver function panel. His TSH was also normal at 1.28 and vitamin D at 34.8 ng/mL. There were no CBC results available for review until 10/06/2015 when he had total WBC of 12,800 including neutrophils 2900 and lymphocytes 9600. His hemoglobin was 12.4, MCV 91, platelets 171,000. At that time patient had right knee replacement. Chemistry lab showed normal TSH of 1.8 and vitamin D at 29.6 ng/mL.     On April 16 of 2015, laboratory study on the day of surgery reported a total WBC of 13,300 including  neutrophils 2400, lymphocytes 10,400, hemoglobin 13.5 and platelets 149,000. MCV was 95. Postop hemoglobin was 10.5 on April 21 2015.      On 3/24/16, the day of his initial oncology evaluation, his total WBC was 10,960, ANC 3000 and lymphocyte 7,210, Platelet 147,000 and hemoglobin 12.3 g/dl. , normal CMP. Serum iron 150, TIBC 321, iron saturation 47, ferritin 60.5 ng/ml, folate 16.6 ng/ml, B12 at 625 pg/ml.      Peripheral blood Flowcytometry study on 3/24/16 reported chronic lymphocytic leukemia. Negative for CD38 and ZAP70 by flow. The CLL panel reported positive for 55.5% cells deletion of chrom 13q14 DLEU1/DLEU2, negative for all the others.      CT scan on 3/29/2016 reported the neck and chest are largely unremarkable. No mass or adenopathy is present at these levels. The spleen is enlarged 16 cm at the craniocaudal extent. There are some mildly prominent lymph nodes in the bilateral inguinal regions with the largest node at the right inguinal level measuring up to 2.7 cm long axis.  . No further adenopathy is identified within the abdomen or pelvis.      Allergies   Allergen Reactions   • Penicillins Rash     Current Outpatient Medications on File Prior to Visit   Medication Sig Dispense Refill   • acetaminophen (TYLENOL) 500 MG tablet Take 500 mg by mouth Every 6 (Six) Hours As Needed for Mild Pain .     • carbidopa-levodopa (SINEMET)  MG per tablet Take 1.5 tablets by mouth 4 (Four) Times a Day.     • CBD (cannabidiol) oral oil Take  by mouth.     • citalopram (CeleXA) 40 MG tablet Take 1 tablet by mouth Daily. 90 tablet 3   • Cyanocobalamin (Vitamin B-12 ER) 2000 MCG tablet controlled-release Place 1 tablet under the tongue Daily. 90 each 3   • Docusate Calcium (STOOL SOFTENER PO) Take 1 tablet by mouth Daily.     • fluticasone (FLONASE) 50 MCG/ACT nasal spray 2 sprays into each nostril Daily. (Patient taking differently: 2 sprays into the nostril(s) as directed by provider As Needed.) 3  "bottle 1   • mesalamine (LIALDA) 1.2 G EC tablet Take 3 tablets by mouth Daily. (Patient taking differently: Take 3 tablets by mouth Daily. Pt takes 4 tabs a day) 270 tablet 1   • Omega-3 Fatty Acids (FISH OIL) 1000 MG capsule capsule Take  by mouth daily with breakfast.     • omeprazole (priLOSEC) 40 MG capsule Take 1 capsule by mouth Daily. 30 capsule 3   • aspirin 81 MG EC tablet Take 81 mg by mouth Daily.     • vitamin D (ERGOCALCIFEROL) 1.25 MG (47748 UT) capsule capsule Take 1 capsule by mouth 1 (One) Time Per Week. 12 capsule 0     No current facility-administered medications on file prior to visit.            Vitals:    10/28/22 1121   Pulse: 58   Temp: 97.7 °F (36.5 °C)   TempSrc: Temporal   SpO2: 99%   Weight: 84.8 kg (187 lb)   Height: 185.4 cm (72.99\")     Physical Exam     Patient screened  for depression based on a PHQ-9 score of 0 on 10/10/2022.           GENERAL:  Well-developed, well-nourished  Patient  in no acute distress. In a wheel chair  SKIN:  Warm, dry ,NO purpura ,no rash.  HEENT:  Pupils were equal and reactive to light and accomodation, conjunctivae noninjected, normal extraocular movements, normal visual acuity.   NECK:  Supple with good range of motion; no thyromegaly , no JVD or bruits,.No carotid artery pain, no carotid abnormal pulsation   LYMPHATICS:  No cervical, NO supraclavicular, NO axillary, NO inguinal adenopathies.  CARDIAC   normal rate , regular rhythm, without murmur,NO rubs NO S3 NO S4   LUNGS: normal breath sounds bilateral, no wheezing, NO rhonchi, NO crackles ,NO rubs.  VASCULAR VENOUS: no cyanosis, NO collateral circulation, NO varicosities, NO edema, NO palpable cords, NO pain,NO erythema, NO pigmentation of the skin.  ABDOMEN:  Soft, NO pain,no hepatomegaly, no splenomegaly,no masses, no ascites, no collateral circulation,no distention.  EXTREMITIES  AND SPINE:  No clubbing, no cyanosis ,no deformities , no pain .No kyphosis,  no pain in spine, no pain in ribs , no " pain in pelvic bone.  NEUROLOGICAL:  Patient was awake, alert, oriented to time, person and place.         DIAGNOSTIC      Lab Results   Component Value Date    WBC 16.95 (H) 10/06/2022    HGB 11.7 (L) 10/06/2022    HCT 36.4 (L) 10/06/2022    MCV 99.2 (H) 10/06/2022     10/06/2022     Lab Results   Component Value Date    NEUTROABS 5.14 10/06/2022         lymph  11,250     Lab Results   Component Value Date    GLUCOSE 101 10/06/2022    BUN 15 10/06/2022    CREATININE 0.43 (L) 10/06/2022    EGFRIFNONA 146 08/18/2021    EGFRIFAFRI >150 03/24/2021    BCR 34.9 (H) 10/06/2022    CO2 26.9 10/06/2022    CALCIUM 9.4 10/06/2022    PROTENTOTREF 6.1 03/24/2021    ALBUMIN 4.30 10/06/2022    LABIL2 2.6 03/24/2021    AST 7 10/06/2022    ALT <5 10/06/2022         ASSESSMENT:  1. Chronic lymphocytic leukemia, Koch stage 0 (minimal not palpable splenomegaly, without severe anemia or thrombocytopenia). 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 No B symptom. The patient has no obvious alteration in the laboratory parameters comparing on how the blood counts were in 6/19 having persistent leukocytosis, lymphocytosis, normal hemoglobin, normal platelet count. He has no progressive adenopathy. No progressive splenomegaly. No infections and no autoimmunity.   I discussed with the patient and his wife the fact that the good news is that his chronic lymphoid leukemia is not changing over time and his white count today is no different than what it was 3 years ago. The hemoglobin remains excellent, the platelet count is excellent. The lymphocytosis obeys to the leukemia and has not changed. He has no peripheral adenopathy, no splenomegaly and no autoimmunity. Therefore the leukemia at this time is quiet and does not require any radiological assessment or any therapeutic intervention.     The patient was further reviewed on 08/18/2021. His physical exam in  regard to leukemia has not changed overall with no peripheral adenopathy, hepatosplenomegaly. No B symptoms, no autoimmunity and no infection. His white count today is 17,000, no different than what it was 4 years ago. The hemoglobin and platelet count remain stable. The total lymphocyte count is about the same. Overall his CLL is very quiet. I find no need for intervention. I find no need for radiological assessment or any other laboratory testing on him at this point.       1. The patient was reviewed on 10/28/2022. From the point of view of his CLL, he has no symptoms pertinent to this including no fevers, no chills, no sweats, no pruritus, no deterioration in performance status, no infections, no autoimmunity. He has no peripheral adenopathy or hepatosplenomegaly. His white count remains stable at 16,000, hemoglobin stable at 12, platelet count stable, total lymphocyte count remains stable as well. Therefore, from this point of view I find no need for this patient to pursue any other intervention, radiological assessment, or initiation of any therapy.     2. Given the fact that all the patients with CLL are immunocompromised independent of the stage of disease and treatment or no treatment, the patient will require to receive Evusheld. He already has had his flu shot a few days ago. His wife already has an appointment for him to have his booster shot for COVID next week and I went ahead and scheduled him to receive Evusheld in 3 or 4 weeks from now. I explained to him and his wife the need for this under the present circumstances especially in somebody who is so social still at this time in his life. We want to continue granting the need for him to be able to assist  to this that is crucial in regard to his neurological function and ability to perform.     I pointed out to him that this medicine should be repeated in 6 months and I went ahead and made an appointment in 7 months from now to have a repeat CBC, CMP  and new round of Evusheld.     Otherwise I will review him back in 7 months with a CBC and CMP at that time.

## 2022-11-14 ENCOUNTER — TELEPHONE (OUTPATIENT)
Dept: ONCOLOGY | Facility: CLINIC | Age: 82
End: 2022-11-14

## 2022-11-14 NOTE — TELEPHONE ENCOUNTER
Returned call to patient and message left that his upcoming appointment is for Daren.  My direct number was also left for him to return call if needed.

## 2022-11-14 NOTE — TELEPHONE ENCOUNTER
Caller: Trina Ackerman    Relationship: Emergency Contact    Best call back number: [phone]    What is the best time to reach you: ANYTIME    Who are you requesting to speak with (clinical staff, provider,  specific staff member): DR GOLDSTEIN OR NURSE    What was the call regarding: PLEASE CALL TRINA TO CLARIFY IF PT NEEDS 11/15 INF APPT. SHE WAS UNAWARE OF THIS APPT. CAN LEAVE DETAILED VM IF NO ANSWER.     Do you require a callback: YES

## 2022-11-15 ENCOUNTER — INFUSION (OUTPATIENT)
Dept: ONCOLOGY | Facility: HOSPITAL | Age: 82
End: 2022-11-15

## 2022-11-15 VITALS
TEMPERATURE: 97.5 F | DIASTOLIC BLOOD PRESSURE: 90 MMHG | HEART RATE: 62 BPM | SYSTOLIC BLOOD PRESSURE: 197 MMHG | OXYGEN SATURATION: 95 % | RESPIRATION RATE: 16 BRPM

## 2022-11-15 DIAGNOSIS — C91.10 CLL (CHRONIC LYMPHOCYTIC LEUKEMIA): Primary | ICD-10-CM

## 2022-11-15 DIAGNOSIS — C91.10 CHRONIC LYMPHOCYTIC LEUKEMIA: ICD-10-CM

## 2022-11-15 PROCEDURE — M0220 HC INJECTION, TIXAGEVIMAB AND CILGAVIMAB: HCPCS | Performed by: INTERNAL MEDICINE

## 2022-11-15 PROCEDURE — 25010000002 INJECTION, TIXAGEVIMAB AND CILGAVIMAB,: Performed by: INTERNAL MEDICINE

## 2022-11-15 RX ORDER — DIPHENHYDRAMINE HCL 25 MG
50 CAPSULE ORAL ONCE AS NEEDED
OUTPATIENT
Start: 2022-11-15

## 2022-11-15 RX ORDER — EPINEPHRINE 1 MG/ML
0.3 INJECTION, SOLUTION, CONCENTRATE INTRAVENOUS AS NEEDED
OUTPATIENT
Start: 2022-11-15

## 2022-11-15 RX ADMIN — AZD7442 300 MG: KIT at 15:55

## 2023-02-06 ENCOUNTER — TELEPHONE (OUTPATIENT)
Dept: INTERNAL MEDICINE | Facility: CLINIC | Age: 83
End: 2023-02-06
Payer: MEDICARE

## 2023-02-06 NOTE — TELEPHONE ENCOUNTER
ORDERED FAXED    ----- Message from Sanjuana De La Cruz MD sent at 2/3/2023  8:41 AM EST -----  Regarding: FW: REHAB  ok  ----- Message -----  From: Chelsy Butler MA  Sent: 2/3/2023   8:38 AM EST  To: Sanjuana De La Cruz MD  Subject: FW: REHAB                                          ----- Message -----  From: Marva Gerardo  Sent: 2/2/2023   1:29 PM EST  To: Chelsy Butler MA  Subject: REHAB                                            Amarjit from [facility] phoned needed written order to evaluate and treat occupational therapy patient has been falling a lot. Please fax order to [phone] no need to dial 1 first also if you have any questions you may call [phone] that is his direct line.  Thank you

## 2023-02-07 NOTE — TELEPHONE ENCOUNTER
THE PREVIOUS MESSAGE FORGOT TO RELAY THAT HE ALSO WANTED MR PAGAN TO HAVE PHYSICALTHERAPY. PLEASE ADD PHYS THERAPY TO THE ORDER THAT WAS SENT OVER THIS MORNING.   FAX IT TO:  Please fax order to [phone] no need to dial 1 first also if you have any questions you may call [phone] that is his direct line.  Thank you

## 2023-03-13 ENCOUNTER — TELEPHONE (OUTPATIENT)
Dept: INTERNAL MEDICINE | Facility: CLINIC | Age: 83
End: 2023-03-13
Payer: MEDICARE

## 2023-03-13 DIAGNOSIS — R13.10 DYSPHAGIA, UNSPECIFIED TYPE: Primary | ICD-10-CM

## 2023-03-13 NOTE — TELEPHONE ENCOUNTER
----- Message from Marva Gerardo sent at 3/10/2023  2:35 PM EST -----  Regarding: ORDER  Physical Therapy phoned need order for speech therapy evaluate and treat for swallowing, please fax to [phone]. Thank you

## 2023-03-27 NOTE — TELEPHONE ENCOUNTER
Amarjit with [facility] called. Wanting speech therapy orders for Mr Ackerman faxed to [phone].  Orders faxed

## 2023-03-30 DIAGNOSIS — G20 PARKINSON DISEASE: ICD-10-CM

## 2023-03-30 DIAGNOSIS — E78.00 HYPERCHOLESTEROLEMIA: ICD-10-CM

## 2023-03-30 DIAGNOSIS — I10 BENIGN ESSENTIAL HYPERTENSION: ICD-10-CM

## 2023-03-30 DIAGNOSIS — E55.9 VITAMIN D DEFICIENCY: ICD-10-CM

## 2023-05-15 ENCOUNTER — OFFICE VISIT (OUTPATIENT)
Dept: ONCOLOGY | Facility: CLINIC | Age: 83
End: 2023-05-15
Payer: MEDICARE

## 2023-05-15 ENCOUNTER — LAB (OUTPATIENT)
Dept: LAB | Facility: HOSPITAL | Age: 83
End: 2023-05-15
Payer: MEDICARE

## 2023-05-15 VITALS
DIASTOLIC BLOOD PRESSURE: 75 MMHG | BODY MASS INDEX: 22.52 KG/M2 | SYSTOLIC BLOOD PRESSURE: 130 MMHG | HEIGHT: 73 IN | OXYGEN SATURATION: 96 % | HEART RATE: 64 BPM | WEIGHT: 169.9 LBS | TEMPERATURE: 97.5 F

## 2023-05-15 DIAGNOSIS — C91.10 CHRONIC LYMPHOCYTIC LEUKEMIA: Primary | ICD-10-CM

## 2023-05-15 DIAGNOSIS — C91.10 CLL (CHRONIC LYMPHOCYTIC LEUKEMIA): ICD-10-CM

## 2023-05-15 DIAGNOSIS — C91.10 CLL (CHRONIC LYMPHOCYTIC LEUKEMIA): Primary | ICD-10-CM

## 2023-05-15 DIAGNOSIS — D69.6 THROMBOCYTOPENIA: ICD-10-CM

## 2023-05-15 LAB
ALBUMIN SERPL-MCNC: 4.3 G/DL (ref 3.5–5.2)
ALBUMIN/GLOB SERPL: 2.2 G/DL (ref 1.1–2.4)
ALP SERPL-CCNC: 57 U/L (ref 38–116)
ALT SERPL W P-5'-P-CCNC: <5 U/L (ref 0–41)
ANION GAP SERPL CALCULATED.3IONS-SCNC: 10.6 MMOL/L (ref 5–15)
AST SERPL-CCNC: 9 U/L (ref 0–40)
BASOPHILS # BLD AUTO: 0.05 10*3/MM3 (ref 0–0.2)
BASOPHILS NFR BLD AUTO: 0.3 % (ref 0–1.5)
BILIRUB SERPL-MCNC: 0.5 MG/DL (ref 0.2–1.2)
BUN SERPL-MCNC: 14 MG/DL (ref 6–20)
BUN/CREAT SERPL: 38.9 (ref 7.3–30)
CALCIUM SPEC-SCNC: 9.1 MG/DL (ref 8.5–10.2)
CHLORIDE SERPL-SCNC: 106 MMOL/L (ref 98–107)
CO2 SERPL-SCNC: 25.4 MMOL/L (ref 22–29)
CREAT SERPL-MCNC: 0.36 MG/DL (ref 0.7–1.3)
DEPRECATED RDW RBC AUTO: 60.5 FL (ref 37–54)
EGFRCR SERPLBLD CKD-EPI 2021: 111.8 ML/MIN/1.73
EOSINOPHIL # BLD AUTO: 0.08 10*3/MM3 (ref 0–0.4)
EOSINOPHIL NFR BLD AUTO: 0.6 % (ref 0.3–6.2)
ERYTHROCYTE [DISTWIDTH] IN BLOOD BY AUTOMATED COUNT: 16.8 % (ref 12.3–15.4)
GLOBULIN UR ELPH-MCNC: 2 GM/DL (ref 1.8–3.5)
GLUCOSE SERPL-MCNC: 105 MG/DL (ref 74–124)
HCT VFR BLD AUTO: 36.8 % (ref 37.5–51)
HGB BLD-MCNC: 11.5 G/DL (ref 13–17.7)
IMM GRANULOCYTES # BLD AUTO: 0.03 10*3/MM3 (ref 0–0.05)
IMM GRANULOCYTES NFR BLD AUTO: 0.2 % (ref 0–0.5)
LYMPHOCYTES # BLD AUTO: 10.14 10*3/MM3 (ref 0.7–3.1)
LYMPHOCYTES NFR BLD AUTO: 70.3 % (ref 19.6–45.3)
MCH RBC QN AUTO: 30.9 PG (ref 26.6–33)
MCHC RBC AUTO-ENTMCNC: 31.3 G/DL (ref 31.5–35.7)
MCV RBC AUTO: 98.9 FL (ref 79–97)
MONOCYTES # BLD AUTO: 0.39 10*3/MM3 (ref 0.1–0.9)
MONOCYTES NFR BLD AUTO: 2.7 % (ref 5–12)
NEUTROPHILS NFR BLD AUTO: 25.9 % (ref 42.7–76)
NEUTROPHILS NFR BLD AUTO: 3.73 10*3/MM3 (ref 1.7–7)
NRBC BLD AUTO-RTO: 0 /100 WBC (ref 0–0.2)
PLATELET # BLD AUTO: 154 10*3/MM3 (ref 140–450)
PMV BLD AUTO: 9.3 FL (ref 6–12)
POTASSIUM SERPL-SCNC: 4.1 MMOL/L (ref 3.5–4.7)
PROT SERPL-MCNC: 6.3 G/DL (ref 6.3–8)
RBC # BLD AUTO: 3.72 10*6/MM3 (ref 4.14–5.8)
SODIUM SERPL-SCNC: 142 MMOL/L (ref 134–145)
WBC NRBC COR # BLD: 14.42 10*3/MM3 (ref 3.4–10.8)

## 2023-05-15 PROCEDURE — 80053 COMPREHEN METABOLIC PANEL: CPT

## 2023-05-15 PROCEDURE — 36415 COLL VENOUS BLD VENIPUNCTURE: CPT

## 2023-05-15 PROCEDURE — 85025 COMPLETE CBC W/AUTO DIFF WBC: CPT

## 2023-05-15 NOTE — PROGRESS NOTES
REASON FOR FOLLOWUP: CHRONIC LYMPHOCYTIC LEUKEMIA. PATEL STAGE 0     HISTORY OF PRESENT ILLNESS:    On 05/15/2023 I had the opportunity to see this 83-year-old male in company of his wife who has been brought from assisted living facility where he is living at this time given the fact that his wife could not handle him at home anymore. He has Parkinson's disease, now he has had further deterioration in his gait, high potential to fall, tremendous difficulty with physical therapy and having now developing incontinence. In the meantime the patient has not had any fevers, chills, night sweats or pruritus associated with his CLL that we see him for. He never has required any intervention. He has not developed any alterations in his appetite, his weight is stable. He has no cardiovascular or respiratory issues otherwise. He is having more cognitive deficit at this point even though he does not have a lot of tremor neither             Past Medical History:   Diagnosis Date   • Actinic keratosis    • Allergic rhinitis    • Anemia    • Anxiety    • Arteriosclerosis of carotid artery    • Arthritis    • Atrial fibrillation     ABLATION   • Atrial flutter    • CAD (coronary artery disease)    • Cardiomyopathy    • Constipation    • Depression    • Eczema    • Enlarged prostate    • Fatigue    • H/O heart artery stent     X5   • Hearing loss    • HTN (hypertension)    • Hypercholesteremia    • Hypogonadism male    • Leukocytosis    • Memory change    • Mitral regurgitation    • Myalgia    • Osteoarthritis     ANKLE, FOOT, RIGHT   • Parkinson's disease    • Prostatitis    • Pulmonic regurgitation    • Right ankle pain    • Skin cancer    • Sleep apnea     CPAP   • Ulcerative colitis      Social History     Socioeconomic History   • Marital status:      Spouse name: Trina Ackerman   • Number of children: 6   Tobacco Use   • Smoking status: Never   • Smokeless tobacco: Never   Substance and Sexual Activity   • Alcohol use: No    • Drug use: No     Family History   Problem Relation Age of Onset   • Heart disease Mother    • Hypertension Mother    • Heart disease Father    • Heart failure Father                  HEMATOLOGY/ONCOLOGY HISTORY:   Patient was initially seen on 3/24/2016 for evaluation leukocytosis. The patient previously was seen by Dr. Landon Phillips and since Dr. Phillips left town recently, he has been seeing Dr. De La Cruz. This patient was seen by orthopedic surgeon, Dr. Gonzalez, for planned right ankle surgical procedure. However, because of elevated WBC patient is referred to us for evaluation and his surgery has been on hold.      Patient reports no fever, sweating, chills and especially no night sweats. He has no significant weight losses (he did have some weight loss after the surgery but has regained). The patient otherwise has no B symptoms or other specific complaints.      According to the patient, he was told having elevated WBC counts in October of 2015 when he had right knee replacement. Per computer records, the last time he had normal WBC was back in July of 2013 when he had laboratory study at Dr. Huntley’s office on 07/16/2013. The study reported a total WBC of 5200 including neutrophils 2600 and lymphocytes 2300. His hemoglobin was 14.7 and platelets 173,000. MCV was 102. His chemistry lab reported normal creatinine of 0.71, normal electrolytes and normal liver function panel. His TSH was also normal at 1.28 and vitamin D at 34.8 ng/mL. There were no CBC results available for review until 10/06/2015 when he had total WBC of 12,800 including neutrophils 2900 and lymphocytes 9600. His hemoglobin was 12.4, MCV 91, platelets 171,000. At that time patient had right knee replacement. Chemistry lab showed normal TSH of 1.8 and vitamin D at 29.6 ng/mL.     On April 16 of 2015, laboratory study on the day of surgery reported a total WBC of 13,300 including neutrophils 2400, lymphocytes 10,400, hemoglobin 13.5 and platelets  149,000. MCV was 95. Postop hemoglobin was 10.5 on April 21 2015.      On 3/24/16, the day of his initial oncology evaluation, his total WBC was 10,960, ANC 3000 and lymphocyte 7,210, Platelet 147,000 and hemoglobin 12.3 g/dl. , normal CMP. Serum iron 150, TIBC 321, iron saturation 47, ferritin 60.5 ng/ml, folate 16.6 ng/ml, B12 at 625 pg/ml.      Peripheral blood Flowcytometry study on 3/24/16 reported chronic lymphocytic leukemia. Negative for CD38 and ZAP70 by flow. The CLL panel reported positive for 55.5% cells deletion of chrom 13q14 DLEU1/DLEU2, negative for all the others.      CT scan on 3/29/2016 reported the neck and chest are largely unremarkable. No mass or adenopathy is present at these levels. The spleen is enlarged 16 cm at the craniocaudal extent. There are some mildly prominent lymph nodes in the bilateral inguinal regions with the largest node at the right inguinal level measuring up to 2.7 cm long axis.  . No further adenopathy is identified within the abdomen or pelvis.      Allergies   Allergen Reactions   • Penicillins Rash     Current Outpatient Medications on File Prior to Visit   Medication Sig Dispense Refill   • acetaminophen (TYLENOL) 500 MG tablet Take 1 tablet by mouth Every 6 (Six) Hours As Needed for Mild Pain.     • aspirin 81 MG EC tablet Take 1 tablet by mouth Daily.     • carbidopa-levodopa (SINEMET)  MG per tablet Take 1.5 tablets by mouth 4 (Four) Times a Day.     • CBD (cannabidiol) oral oil Take  by mouth.     • citalopram (CeleXA) 40 MG tablet Take 1 tablet by mouth Daily. 90 tablet 3   • Cyanocobalamin (Vitamin B-12 ER) 2000 MCG tablet controlled-release Place 1 tablet under the tongue Daily. 90 each 3   • Docusate Calcium (STOOL SOFTENER PO) Take 1 tablet by mouth Daily.     • fluticasone (FLONASE) 50 MCG/ACT nasal spray 2 sprays into each nostril Daily. (Patient taking differently: 2 sprays into the nostril(s) as directed by provider As Needed.) 3 bottle 1  "  • mesalamine (LIALDA) 1.2 G EC tablet Take 3 tablets by mouth Daily. (Patient taking differently: Take 3 tablets by mouth Daily. Pt takes 4 tabs a day) 270 tablet 1   • Omega-3 Fatty Acids (FISH OIL) 1000 MG capsule capsule Take  by mouth daily with breakfast.     • omeprazole (priLOSEC) 40 MG capsule Take 1 capsule by mouth Daily. 30 capsule 3   • vitamin D (ERGOCALCIFEROL) 1.25 MG (18585 UT) capsule capsule Take 1 capsule by mouth 1 (One) Time Per Week. 12 capsule 0     No current facility-administered medications on file prior to visit.            Vitals:    05/15/23 1444   BP: 130/75   Pulse: 64   Temp: 97.5 °F (36.4 °C)   TempSrc: Temporal   SpO2: 96%   Weight: 77.1 kg (169 lb 14.4 oz)   Height: 185.4 cm (72.99\")   PainSc: 0-No pain     Physical Exam     Patient screened  for depression based on a PHQ-9 score of 1 on 5/15/2023.               GENERAL:  Well-developed, Patient  in no acute distress. In a wheel chair  SKIN:  Warm, dry ,NO purpura ,no rash.  HEENT:  Pupils were equal and reactive to light and accomodation, conjunctivae noninjected,  normal visual acuity.   NECK:  Supple with good range of motion; no thyromegaly , no JVD or bruits,.No carotid artery pain, no carotid abnormal pulsation   LYMPHATICS:  No cervical, NO supraclavicular, NO axillary, NO inguinal adenopathies.  CARDIAC   normal rate , regular rhythm, without murmur,NO rubs NO S3 NO S4   LUNGS: normal breath sounds bilateral, no wheezing, NO rhonchi, NO crackles ,NO rubs.  VASCULAR VENOUS: no cyanosis, NO collateral circulation, NO varicosities, NO edema, NO palpable cords, NO pain,NO erythema, NO pigmentation of the skin.  ABDOMEN:  Soft, NO pain,no hepatomegaly, no splenomegaly,no masses, no ascites, no collateral circulation,no distention.  EXTREMITIES  AND SPINE:  No clubbing, no cyanosis ,no deformities , no pain .No kyphosis,  no pain in spine, no pain in ribs , no pain in pelvic bone.  NEUROLOGICAL:  Patient was awake, alert, " oriented to time, person and place.minimal if any tremor , no rigidity in hands or arms         DIAGNOSTIC      Lab Results   Component Value Date    WBC 14.42 (H) 05/15/2023    HGB 11.5 (L) 05/15/2023    HCT 36.8 (L) 05/15/2023    MCV 98.9 (H) 05/15/2023     05/15/2023     Lab Results   Component Value Date    NEUTROABS 3.73 05/15/2023              ASSESSMENT:  1. Chronic lymphocytic leukemia, Koch stage 0 (minimal not palpable splenomegaly, without severe anemia or thrombocytopenia). 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 No B symptom. The patient has no obvious alteration in the laboratory parameters comparing on how the blood counts were in 6/19 having persistent leukocytosis, lymphocytosis, normal hemoglobin, normal platelet count. He has no progressive adenopathy. No progressive splenomegaly. No infections and no autoimmunity.   I discussed with the patient and his wife the fact that the good news is that his chronic lymphoid leukemia is not changing over time and his white count today is no different than what it was 3 years ago. The hemoglobin remains excellent, the platelet count is excellent. The lymphocytosis obeys to the leukemia and has not changed. He has no peripheral adenopathy, no splenomegaly and no autoimmunity. Therefore the leukemia at this time is quiet and does not require any radiological assessment or any therapeutic intervention.     The patient was further reviewed on 08/18/2021. His physical exam in regard to leukemia has not changed overall with no peripheral adenopathy, hepatosplenomegaly. No B symptoms, no autoimmunity and no infection. His white count today is 17,000, no different than what it was 4 years ago. The hemoglobin and platelet count remain stable. The total lymphocyte count is about the same. Overall his CLL is very quiet. I find no need for intervention. I find no need for  radiological assessment or any other laboratory testing on him at this point.       1. The patient was reviewed on 10/28/2022. From the point of view of his CLL, he has no symptoms pertinent to this including no fevers, no chills, no sweats, no pruritus, no deterioration in performance status, no infections, no autoimmunity. He has no peripheral adenopathy or hepatosplenomegaly. His white count remains stable at 16,000, hemoglobin stable at 12, platelet count stable, total lymphocyte count remains stable as well. Therefore, from this point of view I find no need for this patient to pursue any other intervention, radiological assessment, or initiation of any therapy.     2. Given the fact that all the patients with CLL are immunocompromised independent of the stage of disease and treatment or no treatment, the patient will require to receive Evusheld. He already has had his flu shot a few days ago. His wife already has an appointment for him to have his booster shot for COVID next week and I went ahead and scheduled him to receive Evusheld in 3 or 4 weeks from now. I explained to him and his wife the need for this under the present circumstances especially in somebody who is so social still at this time in his life. We want to continue granting the need for him to be able to assist  to this that is crucial in regard to his neurological function and ability to perform.     I pointed out to him that this medicine should be repeated in 6 months and I went ahead and made an appointment in 7 months from now to have a repeat CBC, CMP and new round of Evusheld.     Otherwise I will review him back in 7 months with a CBC and CMP at that time.  On 05/15/2023 the patient was reviewed. At this time he is residing in an assisted living community, his wife cannot take care of him anymore. He has now cognitive deficit, impossible for him to walk anymore. He requires total care and he remains in a wheelchair. He is now incontinent.  Mentally he remains capable of having communication with his family and his wife with no major difficulties and he has no tremor.     In regard to his leukemia he has no symptoms pertinent to this including no fevers, no chills, no sweats, there is no adenopathy or organomegaly on clinical grounds. His white count is actually lower of what it was months ago, 14,000 today with stable hemoglobin and platelet count. His lymphocyte count remains stable. As far as I can tell his leukemia is not changing neither clinically or by numbers. I find no indication to move into any therapy under the present circumstances. We would like to review him back in 6 months with a CBC.    His wife will take him back to the assisted living facility where he resides.

## 2023-05-30 ENCOUNTER — TELEPHONE (OUTPATIENT)
Dept: INTERNAL MEDICINE | Facility: CLINIC | Age: 83
End: 2023-05-30

## 2023-05-30 NOTE — TELEPHONE ENCOUNTER
Daina Gonsales with [facility] called requesting a written order to restart therapy for patient following a hospital visit recently. He wants the order faxed to [phone] (with ATTENTION DAINA written on front sheet).

## 2023-07-25 DIAGNOSIS — G20 PARKINSON DISEASE: Primary | ICD-10-CM

## 2023-07-25 DIAGNOSIS — R13.10 DYSPHAGIA, UNSPECIFIED TYPE: ICD-10-CM

## 2023-08-03 ENCOUNTER — TELEMEDICINE (OUTPATIENT)
Dept: INTERNAL MEDICINE | Facility: CLINIC | Age: 83
End: 2023-08-03
Payer: MEDICARE

## 2023-08-03 DIAGNOSIS — R27.0 ATAXIA: ICD-10-CM

## 2023-08-03 DIAGNOSIS — G20 PARKINSON DISEASE: Primary | ICD-10-CM

## 2023-08-03 PROCEDURE — 99213 OFFICE O/P EST LOW 20 MIN: CPT | Performed by: INTERNAL MEDICINE

## 2023-08-03 RX ORDER — CARBIDOPA/LEVODOPA 25MG-250MG
1 TABLET ORAL 4 TIMES DAILY
COMMUNITY
Start: 2023-05-08

## 2023-08-03 RX ORDER — MIDODRINE HYDROCHLORIDE 2.5 MG/1
2.5 TABLET ORAL 3 TIMES DAILY
Qty: 90 TABLET | Refills: 2 | COMMUNITY
Start: 2023-07-03 | End: 2023-10-01

## 2023-08-03 RX ORDER — ACETAMINOPHEN 160 MG
2000 TABLET,DISINTEGRATING ORAL DAILY
COMMUNITY

## 2023-10-13 RX ORDER — CITALOPRAM 40 MG/1
40 TABLET ORAL DAILY
Qty: 90 TABLET | Refills: 3 | Status: SHIPPED | OUTPATIENT
Start: 2023-10-13

## 2023-11-06 ENCOUNTER — TELEPHONE (OUTPATIENT)
Dept: ONCOLOGY | Facility: CLINIC | Age: 83
End: 2023-11-06

## 2023-11-06 NOTE — TELEPHONE ENCOUNTER
Caller: Trina Ackerman    Relationship to patient: Emergency Contact    Best call back number: [phone]    Chief complaint: PATIENT GOING INTO HOSPICE CARE    Type of visit: LAB & F/U 1     Requested date: NOT R/S APPTS GOING INTO HOSPICE CAR    If rescheduling, when is the original appointment: 11/9/2023    Additional notes:

## 2024-01-15 ENCOUNTER — TELEPHONE (OUTPATIENT)
Dept: INTERNAL MEDICINE | Facility: CLINIC | Age: 84
End: 2024-01-15

## 2024-08-02 ENCOUNTER — TELEPHONE (OUTPATIENT)
Dept: INTERNAL MEDICINE | Facility: CLINIC | Age: 84
End: 2024-08-02

## 2024-08-02 NOTE — TELEPHONE ENCOUNTER
DELETE AFTER REVIEWING: Send this encounter to the appropriate pool. See your Call Action Grid or Workflows for direction.    Caller: Trina Ackerman    Relationship to patient: Emergency Contact    Best call back number: [phone]     Chief complaint: TROUBLE BREATHING. WIFE STATED IT COULD BE HIS HEART. ADVISED TO GO TO THE EMERGENCY ROOM. WIFE DECLINED, BUT STATED THEY WOULD CALL 911 IF NEEDED    Patient directed to call 911 or go to their nearest emergency room. YES    Patient verbalized understanding: [x] Yes  [] No  If no, why?    Additional notes:WARM TRANSFERRED TO THE OFFICE

## 2024-08-02 NOTE — TELEPHONE ENCOUNTER
Spoke with wife Trina, She said that the patient wake up holding his chest and saying that it hurt. He was having a hard time catching his breath. He is doing better now. Wife mentioned that he has Parkinsons and has had some trouble swallowing recently so she is not sure if he may have aspirated while sleeping or if it may be his heart. They do not want to go sit at the ED for hours but will take him if things get worse.

## 2024-10-07 RX ORDER — CITALOPRAM HYDROBROMIDE 40 MG/1
40 TABLET ORAL DAILY
Qty: 90 TABLET | Refills: 1 | Status: SHIPPED | OUTPATIENT
Start: 2024-10-07

## 2024-10-09 DIAGNOSIS — I10 BENIGN ESSENTIAL HYPERTENSION: ICD-10-CM

## 2024-10-09 DIAGNOSIS — E78.00 HYPERCHOLESTEROLEMIA: ICD-10-CM

## 2024-10-09 DIAGNOSIS — E55.9 VITAMIN D DEFICIENCY: ICD-10-CM

## 2024-10-09 DIAGNOSIS — Z00.00 MEDICARE ANNUAL WELLNESS VISIT, SUBSEQUENT: Primary | ICD-10-CM

## 2024-10-09 DIAGNOSIS — I48.91 ATRIAL FIBRILLATION, UNSPECIFIED TYPE: ICD-10-CM

## 2024-10-11 ENCOUNTER — LAB (OUTPATIENT)
Dept: LAB | Facility: HOSPITAL | Age: 84
End: 2024-10-11
Payer: MEDICARE

## 2024-10-11 LAB
25(OH)D3 SERPL-MCNC: 22.3 NG/ML (ref 30–100)
ALBUMIN SERPL-MCNC: 4.3 G/DL (ref 3.5–5.2)
ALBUMIN/GLOB SERPL: 1.7 G/DL
ALP SERPL-CCNC: 74 U/L (ref 39–117)
ALT SERPL W P-5'-P-CCNC: <5 U/L (ref 1–41)
ANION GAP SERPL CALCULATED.3IONS-SCNC: 8.1 MMOL/L (ref 5–15)
AST SERPL-CCNC: 15 U/L (ref 1–40)
BASOPHILS # BLD AUTO: 0.05 10*3/MM3 (ref 0–0.2)
BASOPHILS NFR BLD AUTO: 0.3 % (ref 0–1.5)
BILIRUB SERPL-MCNC: 0.3 MG/DL (ref 0–1.2)
BUN SERPL-MCNC: 14 MG/DL (ref 8–23)
BUN/CREAT SERPL: 22.2 (ref 7–25)
CALCIUM SPEC-SCNC: 9.7 MG/DL (ref 8.6–10.5)
CHLORIDE SERPL-SCNC: 105 MMOL/L (ref 98–107)
CO2 SERPL-SCNC: 29.9 MMOL/L (ref 22–29)
CREAT SERPL-MCNC: 0.63 MG/DL (ref 0.76–1.27)
DEPRECATED RDW RBC AUTO: 48.4 FL (ref 37–54)
EGFRCR SERPLBLD CKD-EPI 2021: 93.8 ML/MIN/1.73
EOSINOPHIL # BLD AUTO: 0.21 10*3/MM3 (ref 0–0.4)
EOSINOPHIL NFR BLD AUTO: 1.3 % (ref 0.3–6.2)
ERYTHROCYTE [DISTWIDTH] IN BLOOD BY AUTOMATED COUNT: 13.8 % (ref 12.3–15.4)
GLOBULIN UR ELPH-MCNC: 2.5 GM/DL
GLUCOSE SERPL-MCNC: 104 MG/DL (ref 65–99)
HCT VFR BLD AUTO: 40.8 % (ref 37.5–51)
HGB BLD-MCNC: 13.2 G/DL (ref 13–17.7)
IMM GRANULOCYTES # BLD AUTO: 0.03 10*3/MM3 (ref 0–0.05)
IMM GRANULOCYTES NFR BLD AUTO: 0.2 % (ref 0–0.5)
LYMPHOCYTES # BLD AUTO: 11.6 10*3/MM3 (ref 0.7–3.1)
LYMPHOCYTES NFR BLD AUTO: 73.7 % (ref 19.6–45.3)
MCH RBC QN AUTO: 30.9 PG (ref 26.6–33)
MCHC RBC AUTO-ENTMCNC: 32.4 G/DL (ref 31.5–35.7)
MCV RBC AUTO: 95.6 FL (ref 79–97)
MONOCYTES # BLD AUTO: 0.4 10*3/MM3 (ref 0.1–0.9)
MONOCYTES NFR BLD AUTO: 2.5 % (ref 5–12)
NEUTROPHILS NFR BLD AUTO: 22 % (ref 42.7–76)
NEUTROPHILS NFR BLD AUTO: 3.45 10*3/MM3 (ref 1.7–7)
NRBC BLD AUTO-RTO: 0 /100 WBC (ref 0–0.2)
PLAT MORPH BLD: NORMAL
PLATELET # BLD AUTO: 151 10*3/MM3 (ref 140–450)
PMV BLD AUTO: 9.4 FL (ref 6–12)
POTASSIUM SERPL-SCNC: 4.3 MMOL/L (ref 3.5–5.2)
PROT SERPL-MCNC: 6.8 G/DL (ref 6–8.5)
RBC # BLD AUTO: 4.27 10*6/MM3 (ref 4.14–5.8)
RBC MORPH BLD: NORMAL
SODIUM SERPL-SCNC: 143 MMOL/L (ref 136–145)
TSH SERPL DL<=0.05 MIU/L-ACNC: 1.23 UIU/ML (ref 0.27–4.2)
WBC MORPH BLD: NORMAL
WBC NRBC COR # BLD AUTO: 15.74 10*3/MM3 (ref 3.4–10.8)

## 2024-10-11 PROCEDURE — 36415 COLL VENOUS BLD VENIPUNCTURE: CPT | Performed by: INTERNAL MEDICINE

## 2024-10-11 PROCEDURE — 85025 COMPLETE CBC W/AUTO DIFF WBC: CPT | Performed by: INTERNAL MEDICINE

## 2024-10-11 PROCEDURE — 80061 LIPID PANEL: CPT | Performed by: INTERNAL MEDICINE

## 2024-10-11 PROCEDURE — 85007 BL SMEAR W/DIFF WBC COUNT: CPT | Performed by: INTERNAL MEDICINE

## 2024-10-11 PROCEDURE — 80053 COMPREHEN METABOLIC PANEL: CPT | Performed by: INTERNAL MEDICINE

## 2024-10-11 PROCEDURE — 84443 ASSAY THYROID STIM HORMONE: CPT | Performed by: INTERNAL MEDICINE

## 2024-10-11 PROCEDURE — 82306 VITAMIN D 25 HYDROXY: CPT | Performed by: INTERNAL MEDICINE

## 2024-10-12 LAB
CHOLEST SERPL-MCNC: 172 MG/DL (ref 100–199)
HDLC SERPL-MCNC: 50 MG/DL
LDLC SERPL CALC-MCNC: 111 MG/DL (ref 0–99)
LDLC/HDLC SERPL: 2.2 RATIO (ref 0–3.6)
TRIGL SERPL-MCNC: 55 MG/DL (ref 0–149)
VLDLC SERPL CALC-MCNC: 11 MG/DL (ref 5–40)

## 2024-10-17 ENCOUNTER — OFFICE VISIT (OUTPATIENT)
Dept: INTERNAL MEDICINE | Facility: CLINIC | Age: 84
End: 2024-10-17
Payer: MEDICARE

## 2024-10-17 VITALS
WEIGHT: 180.5 LBS | HEIGHT: 73 IN | HEART RATE: 60 BPM | OXYGEN SATURATION: 96 % | DIASTOLIC BLOOD PRESSURE: 56 MMHG | SYSTOLIC BLOOD PRESSURE: 94 MMHG | BODY MASS INDEX: 23.92 KG/M2

## 2024-10-17 DIAGNOSIS — Z23 NEED FOR INFLUENZA VACCINATION: ICD-10-CM

## 2024-10-17 DIAGNOSIS — G20.A2 PARKINSON'S DISEASE WITHOUT DYSKINESIA, WITH FLUCTUATING MANIFESTATIONS: ICD-10-CM

## 2024-10-17 DIAGNOSIS — R45.3 APATHY: ICD-10-CM

## 2024-10-17 DIAGNOSIS — R47.1 DYSARTHRIA: ICD-10-CM
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
## 2024-10-17 DIAGNOSIS — H91.90 HEARING LOSS, UNSPECIFIED HEARING LOSS TYPE, UNSPECIFIED LATERALITY: ICD-10-CM

## 2024-10-17 DIAGNOSIS — Z00.00 MEDICARE ANNUAL WELLNESS VISIT, SUBSEQUENT: Primary | ICD-10-CM

## 2024-10-17 RX ORDER — ERGOCALCIFEROL 1.25 MG/1
50000 CAPSULE, LIQUID FILLED ORAL WEEKLY
Qty: 12 CAPSULE | Refills: 0 | Status: SHIPPED | OUTPATIENT
Start: 2024-10-17

## 2024-10-17 NOTE — PROGRESS NOTES
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   Fu with FL  Anxiety       He has been stable with parkinsons  seeing neuro for this  Pt has been compliant with meds for GERD.  No sx as long as pt takes medicine as prescribed.  No epigastric pain or reflux sx  He does have some falls related to shuffles gait  grandson is usually able to come and help  She does have a caretaker at home to help  He does feel like the celexa is helping    The following portions of the patient's history were reviewed and updated as appropriate: allergies, current medications, past family history, past medical history, past social history, past surgical history, and problem list.  No tob no etoh  no reg exercise    Review of Systems    Objective   Physical Exam  Vitals reviewed.   Constitutional:       Appearance: He is well-developed.   HENT:      Head: Normocephalic and atraumatic.      Right Ear: External ear normal.      Left Ear: External ear normal.   Eyes:      Conjunctiva/sclera: Conjunctivae normal.      Pupils: Pupils are equal, round, and reactive to light.   Neck:      Thyroid: No thyromegaly.      Trachea: No tracheal deviation.   Cardiovascular:      Rate and Rhythm: Normal rate and regular rhythm.      Heart sounds: Normal heart sounds.   Pulmonary:      Effort: Pulmonary effort is normal.      Breath sounds: Normal breath sounds.   Abdominal:      General: Bowel sounds are normal. There is no distension.      Palpations: Abdomen is soft.      Tenderness: There is no abdominal tenderness.   Musculoskeletal:         General: No deformity. Normal range of motion.      Cervical back: Normal range of motion.   Skin:     General: Skin is warm and dry.   Neurological:      Mental Status: He is alert and oriented to person, place, and time.      Comments: Slurred speak   Psychiatric:         Behavior: Behavior normal.         Thought Content: Thought content normal.         Judgment: Judgment normal.         Vitals:    10/17/24  1012   BP: 94/56   Pulse: 60   SpO2: 96%     Body mass index is 23.82 kg/m².         Assessment & Plan   Diagnoses and all orders for this visit:    1. Medicare annual wellness visit, subsequent (Primary)    2. Apathy    3. Parkinson's disease without dyskinesia, with fluctuating manifestations    4. Fall, sequela    5. Dysarthria    6. Hearing loss, unspecified hearing loss type, unspecified laterality  -     Ambulatory Referral to ENT (Otolaryngology)    Other orders  -     vitamin D (ERGOCALCIFEROL) 1.25 MG (12847 UT) capsule capsule; Take 1 capsule by mouth 1 (One) Time Per Week.  Dispense: 12 capsule; Refill: 0    Parkinsonian sx-  seeing neuro  strength getting worse  which contributes to the   2.   GERD-  ok with omeprazole as needed  3. Colitis-  doing well with lialda  4.  Depression-  ok with current meds  5. Hearing loss-  he ahs been throguht 2 sets of hearing aid  needs a new jackelyn-  send to ent to al for this  6.  Vit D def-  we will do rx  for three months then otc

## 2024-10-17 NOTE — PROGRESS NOTES
Subjective   The ABCs of the Annual Wellness Visit  Medicare Wellness Visit      Driss Ackerman is a 84 y.o. patient who presents for a Medicare Wellness Visit.    The following portions of the patient's history were reviewed and   updated as appropriate: allergies, current medications, past family history, past medical history, past social history, past surgical history, and problem list.    Compared to one year ago, the patient's physical   health is worse.  Compared to one year ago, the patient's mental   health is worse.some worsening memory issues    Recent Hospitalizations:  He was not admitted to the hospital during the last year.     Current Medical Providers:  Patient Care Team:  Sanjuana De La Cruz MD as PCP - General  Sanjuana De La Cruz MD as Referring Physician (Internal Medicine)  Alvin Jackman MD as Consulting Physician (Hematology and Oncology)    Outpatient Medications Prior to Visit   Medication Sig Dispense Refill    acetaminophen (TYLENOL) 500 MG tablet Take 1 tablet by mouth Every 6 (Six) Hours As Needed for Mild Pain.      carbidopa-levodopa (SINEMET)  MG per tablet Take 1 tablet by mouth 4 (Four) Times a Day.      citalopram (CeleXA) 40 MG tablet TAKE 1 TABLET DAILY 90 tablet 1    Cyanocobalamin (Vitamin B-12 ER) 2000 MCG tablet controlled-release Place 1 tablet under the tongue Daily. 90 each 3    Docusate Calcium (STOOL SOFTENER PO) Take 1 tablet by mouth Daily As Needed.      mesalamine (LIALDA) 1.2 G EC tablet Take 3 tablets by mouth Daily. (Patient taking differently: Take 3 tablets by mouth Daily. Pt takes 4 tabs a day) 270 tablet 1    Cholecalciferol (Vitamin D3) 50 MCG (2000 UT) capsule Take 1 capsule by mouth Daily. (Patient not taking: Reported on 10/17/2024)      CBD (cannabidiol) oral oil Take  by mouth.      fluticasone (FLONASE) 50 MCG/ACT nasal spray 2 sprays into each nostril Daily. (Patient not taking: Reported on 10/17/2024) 3 bottle 1    omeprazole (priLOSEC) 40 MG capsule  "Take 1 capsule by mouth Daily. (Patient not taking: Reported on 10/17/2024) 30 capsule 3    vitamin D (ERGOCALCIFEROL) 1.25 MG (75198 UT) capsule capsule Take 1 capsule by mouth 1 (One) Time Per Week. 12 capsule 0     No facility-administered medications prior to visit.     No opioid medication identified on active medication list. I have reviewed chart for other potential  high risk medication/s and harmful drug interactions in the elderly.      Aspirin is not on active medication list.  Aspirin use is not indicated based on review of current medical condition/s. Risk of harm outweighs potential benefits.  .    Patient Active Problem List   Diagnosis    Chronic coronary artery disease    Arthritis    Atrial fibrillation    Benign prostatic hyperplasia with urinary obstruction    Fatigue    Hypercholesterolemia    Hypogonadism in male    Memory impairment    Ulcerative colitis    Vitamin D deficiency    Atopic rhinitis    Benign essential hypertension    Chronic lymphocytic leukemia    Thrombocytopenia    Ulcerative proctitis with complication    Parkinson disease    PAVEL on CPAP    Apathy    History of colonic polyps    Sialorrhea    Dysarthria    Fall    Skin cancer of arm, right     Advance Care Planning Advance Directive is on file.  ACP discussion was held with the patient during this visit. Patient has an advance directive in EMR which is still valid.             Objective   Vitals:    10/17/24 1012   BP: 94/56   BP Location: Left arm   Patient Position: Sitting   Pulse: 60   SpO2: 96%   Weight: 81.9 kg (180 lb 8 oz)   Height: 185.4 cm (72.99\")   PainSc: 0-No pain       Estimated body mass index is 23.82 kg/m² as calculated from the following:    Height as of this encounter: 185.4 cm (72.99\").    Weight as of this encounter: 81.9 kg (180 lb 8 oz).    BMI is within normal parameters. No other follow-up for BMI required.       Does the patient have evidence of cognitive impairment? No  Lab Results   Component " Value Date    CHLPL 172 10/11/2024    TRIG 55 10/11/2024    HDL 50 10/11/2024     (H) 10/11/2024    VLDL 11 10/11/2024                                                                                               Health  Risk Assessment    Smoking Status:  Social History     Tobacco Use   Smoking Status Never   Smokeless Tobacco Never     Alcohol Consumption:  Social History     Substance and Sexual Activity   Alcohol Use No       Fall Risk Screen  STEADI Fall Risk Assessment was completed, and patient is at MODERATE risk for falls. Assessment completed on:10/17/2024    Depression Screening:      10/17/2024    10:00 AM   PHQ-2/PHQ-9 Depression Screening   Little interest or pleasure in doing things Not at all   Feeling down, depressed, or hopeless Not at all     Health Habits and Functional and Cognitive Screening:      10/17/2024    10:00 AM   Functional & Cognitive Status   Do you have difficulty preparing food and eating? Yes   Do you have difficulty bathing yourself, getting dressed or grooming yourself? Yes   Do you have difficulty using the toilet? Yes   Do you have difficulty moving around from place to place? Yes   Do you have trouble with steps or getting out of a bed or a chair? Yes   Current Diet Well Balanced Diet   Dental Exam Not up to date   Eye Exam Not up to date   Exercise (times per week) 2 times per week   Current Exercises Include Other        Exercise Comment stretching bands   Do you need help using the phone?  Yes   Are you deaf or do you have serious difficulty hearing?  Yes   Do you need help to go to places out of walking distance? Yes   Do you need help shopping? Yes   Do you need help preparing meals?  Yes   Do you need help with housework?  Yes   Do you need help with laundry? Yes   Do you need help taking your medications? Yes   Do you need help managing money? Yes   Do you ever drive or ride in a car without wearing a seat belt? No   Have you felt unusual stress, anger or  loneliness in the last month? No   Who do you live with? Spouse   If you need help, do you have trouble finding someone available to you? No   Have you been bothered in the last four weeks by sexual problems? No   Do you have difficulty concentrating, remembering or making decisions? Yes           Age-appropriate Screening Schedule:  Refer to the list below for future screening recommendations based on patient's age, sex and/or medical conditions. Orders for these recommended tests are listed in the plan section. The patient has been provided with a written plan.    Health Maintenance List  Health Maintenance   Topic Date Due    TDAP/TD VACCINES (1 - Tdap) Never done    ZOSTER VACCINE (1 of 2) 05/06/2011    RSV Vaccine - Adults (1 - 1-dose 75+ series) Never done    ANNUAL WELLNESS VISIT  10/10/2023    INFLUENZA VACCINE  08/01/2024    COVID-19 Vaccine (6 - 2023-24 season) 09/01/2024    LIPID PANEL  10/11/2025    Pneumococcal Vaccine 65+  Completed                                                                                                                                                CMS Preventative Services Quick Reference  Risk Factors Identified During Encounter  Inactivity/Sedentary: Patient was advised to exercise at least 150 minutes a week per CDC recommendations.  Going to join the ca  The above risks/problems have been discussed with the patient.  Pertinent information has been shared with the patient in the After Visit Summary.  An After Visit Summary and PPPS were made available to the patient.    Follow Up:   Next Medicare Wellness visit to be scheduled in 1 year.     Assessment & Plan  Medicare annual wellness visit, subsequent    Apathy    Parkinson's disease without dyskinesia, with fluctuating manifestations    Fall, sequela    Dysarthria    Hearing loss, unspecified hearing loss type, unspecified laterality               Follow Up:   No follow-ups on file.

## 2025-01-02 NOTE — TELEPHONE ENCOUNTER
Called and spoke with Daina, he is faxing over a form to be signed and faxed back to the number above with the same instructions to write ATTENTION DAINA.   Silvedene dressing starting tomorrow  Ice and elevate tonight  Tylenol or mortin for pain  Use the antibiotic if any s/s of infection.

## 2025-01-22 ENCOUNTER — TELEPHONE (OUTPATIENT)
Dept: INTERNAL MEDICINE | Facility: CLINIC | Age: 85
End: 2025-01-22
Payer: MEDICARE

## 2025-01-22 DIAGNOSIS — R53.1 WEAKNESS: ICD-10-CM

## 2025-01-22 DIAGNOSIS — R27.0 ATAXIA: ICD-10-CM

## 2025-01-22 DIAGNOSIS — G20.A2 PARKINSON'S DISEASE WITHOUT DYSKINESIA, WITH FLUCTUATING MANIFESTATIONS: Primary | ICD-10-CM

## 2025-01-22 DIAGNOSIS — R13.10 DYSPHAGIA, UNSPECIFIED TYPE: ICD-10-CM

## 2025-01-22 NOTE — TELEPHONE ENCOUNTER
----- Message from Marva HERNANDEZ sent at 1/17/2025  3:58 PM EST -----  Regarding: HOME HEALTH  Niharika from [facility] phoned wanting to know if Dr. De La Cruz can order Home Health for this patient he needs pt,ot,speech and swolling tips. If you hve any questions her number is [phone]

## 2025-02-04 ENCOUNTER — TELEPHONE (OUTPATIENT)
Dept: INTERNAL MEDICINE | Facility: CLINIC | Age: 85
End: 2025-02-04
Payer: MEDICARE

## 2025-02-04 NOTE — TELEPHONE ENCOUNTER
Called and spoke with patient's wife. She stated that the speech therapist told her that she would need to follow up with Dr. De La Cruz in order for HH visits to continue. I informed her that he is not due for a follow up until April. She stated their insurance has not changed. I told her they may require new orders to extend the HH PT OT and speech therapy. She will call them and ask if there are orders needed.    I advised her to call us back if there are any issues or if they continue to say that he needs to see Dr. De La Cruz for an appointment.

## 2025-02-04 NOTE — TELEPHONE ENCOUNTER
Caller: Trina Ackerman    Relationship: Emergency Contact    Best call back number: [phone]    What is the best time to reach you:     Who are you requesting to speak with (clinical staff, provider,  specific staff member): CLINICAL STAFF         What was the call regarding: WAS CONTACTED BY SRINIVAS, NEEDS TO KNOW IF THE PATIENT WILL NEED TO BE SEEN AGAIN TO HAVE THE HOME HEALTH ORDERS TO BE CONTINUED FOR PT, OT, SPEECH AND SKILLED NURSE, PLEASE ADVISE     Is it okay if the provider responds through MyChart: NO

## 2025-02-20 ENCOUNTER — TELEMEDICINE (OUTPATIENT)
Dept: INTERNAL MEDICINE | Facility: CLINIC | Age: 85
End: 2025-02-20
Payer: MEDICARE

## 2025-02-20 DIAGNOSIS — G20.A2 PARKINSON'S DISEASE WITHOUT DYSKINESIA, WITH FLUCTUATING MANIFESTATIONS: Primary | ICD-10-CM

## 2025-02-20 DIAGNOSIS — R47.1 DYSARTHRIA: ICD-10-CM

## 2025-02-20 PROCEDURE — 99213 OFFICE O/P EST LOW 20 MIN: CPT | Performed by: INTERNAL MEDICINE

## 2025-02-20 PROCEDURE — 1126F AMNT PAIN NOTED NONE PRSNT: CPT | Performed by: INTERNAL MEDICINE

## 2025-02-20 RX ORDER — TRIAMCINOLONE ACETONIDE 1 MG/G
1 CREAM TOPICAL 2 TIMES DAILY
COMMUNITY

## 2025-02-20 NOTE — PROGRESS NOTES
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   You have chosen to receive care through a telehealth visit.  Do you consent to use a video/audio connection for your medical care today? Yes    History of Present Illness   Pt has a hx of parkinsons  he has been OT and PT as well as speech therapy        The following portions of the patient's history were reviewed and updated as appropriate: allergies, current medications, past family history, past medical history, past social history, past surgical history, and problem list.    Review of Systems    Objective   Physical Exam  Constitutional:       Appearance: Normal appearance.   Neurological:      Mental Status: He is alert and oriented to person, place, and time.   Psychiatric:         Behavior: Behavior normal.       There were no vitals filed for this visit.  There is no height or weight on file to calculate BMI.         Assessment & Plan   There are no diagnoses linked to this encounter.     Parkinson's disease-  he is  a fall risk due to ataxia  cont to work with PT  and OT on this.   Also working on ADLs for bathing and toileting  Dysarthria-  related to the parkinsons  he is getting speech for this and that is helping he is getting food down as long as his food is

## 2025-02-21 ENCOUNTER — TELEPHONE (OUTPATIENT)
Dept: INTERNAL MEDICINE | Facility: CLINIC | Age: 85
End: 2025-02-21

## 2025-02-21 NOTE — TELEPHONE ENCOUNTER
from [facility] called in, confirmed PT name and . Wanted documentation from PT's last appointment, confirmed appt for 25. Confirmed fax number is [phone]

## 2025-03-28 ENCOUNTER — READMISSION MANAGEMENT (OUTPATIENT)
Dept: CALL CENTER | Facility: HOSPITAL | Age: 85
End: 2025-03-28
Payer: MEDICARE

## 2025-03-28 NOTE — OUTREACH NOTE
Prep Survey      Flowsheet Row Responses   [facility] facility patient discharged from? Non-BH   Is LACE score < 7 ? Non-BH Discharge   Eligibility Not Eligible   What are the reasons patient is not eligible? Other  [no recent external dicharge noted]   Does the patient have one of the following disease processes/diagnoses(primary or secondary)? Other   Prep survey completed? Yes            Sonia Seaman Registered Nurse

## 2025-04-07 RX ORDER — CITALOPRAM HYDROBROMIDE 40 MG/1
40 TABLET ORAL DAILY
Qty: 90 TABLET | Refills: 1 | Status: SHIPPED | OUTPATIENT
Start: 2025-04-07

## 2025-04-16 ENCOUNTER — TELEPHONE (OUTPATIENT)
Dept: INTERNAL MEDICINE | Facility: CLINIC | Age: 85
End: 2025-04-16
Payer: MEDICARE

## 2025-04-16 NOTE — TELEPHONE ENCOUNTER
Caller: Trina Ackerman    Relationship: Emergency Contact    Best call back number: [phone]     What is the best time to reach you: ANY     Who are you requesting to speak with (clinical staff, provider,  specific staff member): CLINICAL STAFF     Do you know the name of the person who called:      What was the call regarding: PATIENT WIFE IS REQUESTING A FORM FOR  PATIENT'S HANDICAP PLAQUE RENEWED,    Is it okay if the provider responds through MyChart: